# Patient Record
Sex: MALE | Race: WHITE | NOT HISPANIC OR LATINO | Employment: FULL TIME | URBAN - METROPOLITAN AREA
[De-identification: names, ages, dates, MRNs, and addresses within clinical notes are randomized per-mention and may not be internally consistent; named-entity substitution may affect disease eponyms.]

---

## 2019-10-02 ENCOUNTER — TELEPHONE (OUTPATIENT)
Dept: FAMILY MEDICINE CLINIC | Facility: CLINIC | Age: 44
End: 2019-10-02

## 2019-10-15 ENCOUNTER — OFFICE VISIT (OUTPATIENT)
Dept: FAMILY MEDICINE CLINIC | Facility: CLINIC | Age: 44
End: 2019-10-15

## 2019-10-15 VITALS
SYSTOLIC BLOOD PRESSURE: 118 MMHG | OXYGEN SATURATION: 97 % | HEART RATE: 67 BPM | HEIGHT: 76 IN | BODY MASS INDEX: 28.01 KG/M2 | TEMPERATURE: 97.9 F | WEIGHT: 230 LBS | DIASTOLIC BLOOD PRESSURE: 64 MMHG

## 2019-10-15 DIAGNOSIS — F40.01 PANIC DISORDER WITH AGORAPHOBIA: ICD-10-CM

## 2019-10-15 DIAGNOSIS — Z23 NEED FOR INFLUENZA VACCINATION: ICD-10-CM

## 2019-10-15 DIAGNOSIS — F41.9 ANXIETY: Primary | ICD-10-CM

## 2019-10-15 PROCEDURE — 90686 IIV4 VACC NO PRSV 0.5 ML IM: CPT | Performed by: FAMILY MEDICINE

## 2019-10-15 PROCEDURE — 99213 OFFICE O/P EST LOW 20 MIN: CPT | Performed by: FAMILY MEDICINE

## 2019-10-15 PROCEDURE — 90471 IMMUNIZATION ADMIN: CPT | Performed by: FAMILY MEDICINE

## 2019-10-15 RX ORDER — CITALOPRAM 20 MG/1
20 TABLET ORAL DAILY
COMMUNITY
End: 2019-10-15 | Stop reason: SDUPTHER

## 2019-10-15 RX ORDER — CITALOPRAM 40 MG/1
TABLET ORAL
Qty: 90 TABLET | Refills: 1 | Status: SHIPPED | OUTPATIENT
Start: 2019-10-15 | End: 2020-04-06

## 2019-10-15 RX ORDER — PROPRANOLOL HYDROCHLORIDE 10 MG/1
10 TABLET ORAL AS NEEDED
COMMUNITY
End: 2021-06-21

## 2019-10-15 NOTE — PROGRESS NOTES
Chief Complaint   Patient presents with   • Establish Care     transfer care from [facility], med refills   • Anxiety       Subjective   Chandu Headley is a 43 y.o. male.     History of Present Illness   PT is here for refills and to get reestablished and  Anxiety- doing better and he states wants to increase the dosage a little bit.  No depression.   No HI or SI  Agoraphobia- states 10 mg doesn't work so well.      The following portions of the patient's history were reviewed and updated as appropriate: allergies, current medications, past family history, past medical history, past social history, past surgical history and problem list.    Review of Systems   Constitutional: Negative for fatigue.   Eyes: Negative for blurred vision.   Respiratory: Negative for cough, chest tightness and shortness of breath.    Cardiovascular: Negative for chest pain, palpitations and leg swelling.   Neurological: Negative for dizziness, light-headedness and headache.       Patient Active Problem List   Diagnosis   • Panic disorder with agoraphobia   • Onychomycosis of toenail   • High risk medication use   • Allergic rhinitis   • Anxiety       Allergies   Allergen Reactions   • Penicillins Rash         Current Outpatient Medications:   •  citalopram (CeleXA) 40 MG tablet, One po qd, Disp: 90 tablet, Rfl: 1  •  propranolol (INDERAL) 10 MG tablet, Take 10 mg by mouth As Needed (anxiety)., Disp: , Rfl:     Past Medical History:   Diagnosis Date   • Allergic rhinitis    • Anxiety    • High risk medication use    • Onychomycosis of toenail    • Panic disorder with agoraphobia        History reviewed. No pertinent surgical history.    Family History   Problem Relation Age of Onset   • No Known Problems Mother    • Arthritis Paternal Grandmother        Social History     Tobacco Use   • Smoking status: Former Smoker   • Smokeless tobacco: Never Used   Substance Use Topics   • Alcohol use: Yes            Objective     Visit Vitals  /64  "  Pulse 67   Temp 97.9 °F (36.6 °C)   Ht 193 cm (76\")   Wt 104 kg (230 lb)   SpO2 97%   BMI 28.00 kg/m²       Physical Exam   Constitutional: He appears well-developed. No distress.   Eyes: Conjunctivae and lids are normal.   Neck: Trachea normal. Carotid bruit is not present. No thyroid mass and no thyromegaly present.   Cardiovascular: Normal rate, regular rhythm and normal heart sounds.   Pulmonary/Chest: Effort normal and breath sounds normal.   Lymphadenopathy:     He has no cervical adenopathy.   Neurological: He is alert. He is not disoriented.   Skin: Skin is warm and dry.   Psychiatric: He has a normal mood and affect. His speech is normal and behavior is normal. He is attentive.         Procedures    Assessment/Plan   Problems Addressed this Visit        Other    Panic disorder with agoraphobia    Relevant Medications    citalopram (CeleXA) 40 MG tablet    Anxiety - Primary    Relevant Medications    citalopram (CeleXA) 40 MG tablet          No orders of the defined types were placed in this encounter.      Current Outpatient Medications   Medication Sig Dispense Refill   • citalopram (CeleXA) 40 MG tablet One po qd 90 tablet 1   • propranolol (INDERAL) 10 MG tablet Take 10 mg by mouth As Needed (anxiety).       No current facility-administered medications for this visit.      Refill and increase dosage of celexa from 20 mg to 40 mg.    Will double up on the inderal as needed.  SE discussed .  Return for anxiety.  Flu shot today.   There are no Patient Instructions on file for this visit.         "

## 2020-04-04 DIAGNOSIS — F41.9 ANXIETY: ICD-10-CM

## 2020-04-06 RX ORDER — CITALOPRAM 40 MG/1
TABLET ORAL
Qty: 30 TABLET | Refills: 0 | Status: SHIPPED | OUTPATIENT
Start: 2020-04-06 | End: 2020-05-11 | Stop reason: SDUPTHER

## 2020-04-30 DIAGNOSIS — F41.9 ANXIETY: ICD-10-CM

## 2020-05-01 RX ORDER — CITALOPRAM 40 MG/1
TABLET ORAL
Qty: 30 TABLET | Refills: 0 | OUTPATIENT
Start: 2020-05-01

## 2020-05-11 ENCOUNTER — TELEPHONE (OUTPATIENT)
Dept: FAMILY MEDICINE CLINIC | Facility: CLINIC | Age: 45
End: 2020-05-11

## 2020-05-11 DIAGNOSIS — F41.9 ANXIETY: ICD-10-CM

## 2020-05-11 RX ORDER — CITALOPRAM 40 MG/1
TABLET ORAL
Qty: 30 TABLET | Refills: 0 | Status: SHIPPED | OUTPATIENT
Start: 2020-05-11 | End: 2020-05-15

## 2020-05-11 NOTE — TELEPHONE ENCOUNTER
PT REQUESTED TO KNOW IF HE COULD ENOUGH MEDICINE TO LAST HIM UNTIL HIS Friday APPT.. THE MEDICINE IS citalopram (CeleXA) 40 MG tablet. PLEASE ADVISE

## 2020-05-15 ENCOUNTER — TELEMEDICINE (OUTPATIENT)
Dept: FAMILY MEDICINE CLINIC | Facility: CLINIC | Age: 45
End: 2020-05-15

## 2020-05-15 DIAGNOSIS — S76.219A GROIN STRAIN, UNSPECIFIED LATERALITY, INITIAL ENCOUNTER: ICD-10-CM

## 2020-05-15 DIAGNOSIS — F40.01 PANIC DISORDER WITH AGORAPHOBIA: Primary | ICD-10-CM

## 2020-05-15 DIAGNOSIS — F41.9 ANXIETY: ICD-10-CM

## 2020-05-15 PROCEDURE — 99213 OFFICE O/P EST LOW 20 MIN: CPT | Performed by: FAMILY MEDICINE

## 2020-05-15 RX ORDER — ESCITALOPRAM OXALATE 20 MG/1
20 TABLET ORAL DAILY
Qty: 30 TABLET | Refills: 3 | Status: SHIPPED | OUTPATIENT
Start: 2020-05-15 | End: 2020-08-31

## 2020-05-15 NOTE — PROGRESS NOTES
Chief Complaint   Patient presents with   • Anxiety       Subjective   Chandu Headley is a 44 y.o. male.     History of Present Illness   The patient presents today for follow-up via a video visit due to the COVID-19 pandemic for  Anxiety- celexa is not working for him any more.  No issues with depression.  No HI or SI.  Agoraphobia- propranalol not working well anymore either.  5 days ago bent over and injured groin area and was very painful but over past 2 days somewhat better.  He noticed a lump above his groin bc of this but is getting better.    Minimal exercise.        The following portions of the patient's history were reviewed and updated as appropriate: allergies, current medications, past family history, past medical history, past social history, past surgical history and problem list.    Review of Systems   Constitutional: Negative for fatigue.   Eyes: Negative for blurred vision.   Respiratory: Negative for cough, chest tightness and shortness of breath.    Cardiovascular: Negative for chest pain, palpitations and leg swelling.   Neurological: Negative for dizziness, light-headedness and headache.       Patient Active Problem List   Diagnosis   • Panic disorder with agoraphobia   • Onychomycosis of toenail   • High risk medication use   • Allergic rhinitis   • Anxiety   • Groin strain       Allergies   Allergen Reactions   • Penicillins Rash         Current Outpatient Medications:   •  escitalopram (Lexapro) 20 MG tablet, Take 1 tablet by mouth Daily., Disp: 30 tablet, Rfl: 3  •  propranolol (INDERAL) 10 MG tablet, Take 10 mg by mouth As Needed (anxiety)., Disp: , Rfl:     Past Medical History:   Diagnosis Date   • Allergic rhinitis    • Anxiety    • High risk medication use    • Onychomycosis of toenail    • Panic disorder with agoraphobia        History reviewed. No pertinent surgical history.    Family History   Problem Relation Age of Onset   • No Known Problems Mother    • Arthritis Paternal  Grandmother        Social History     Tobacco Use   • Smoking status: Former Smoker   • Smokeless tobacco: Never Used   Substance Use Topics   • Alcohol use: Yes            Objective     There were no vitals taken for this visit. Video Visit    Physical Exam  NAD  AAOx3  No labored breathing.  EOMI   PERRLA    No results found for: HGBA1C    No results found for: [id number]    No results found for: TSH    No results found for: CHOL  No results found for: TRIG  No results found for: HDL  No results found for: LDL  No results found for: VLDL  No results found for: LDLHDL      Procedures    Assessment/Plan   Problems Addressed this Visit        Musculoskeletal and Integument    Groin strain       Other    Panic disorder with agoraphobia - Primary    Relevant Medications    escitalopram (Lexapro) 20 MG tablet    Anxiety    Relevant Medications    escitalopram (Lexapro) 20 MG tablet          No orders of the defined types were placed in this encounter.      Current Outpatient Medications   Medication Sig Dispense Refill   • escitalopram (Lexapro) 20 MG tablet Take 1 tablet by mouth Daily. 30 tablet 3   • propranolol (INDERAL) 10 MG tablet Take 10 mg by mouth As Needed (anxiety).       No current facility-administered medications for this visit.        Return in about 4 months (around 9/15/2020) for anxiety.    Patient Instructions   Try to get more active.       stop celexa and start lexapro.  SE discussed.  Pt to give me update in about 2 weeks.    He will give me update about his groin in 2 weeks and if no better then will get a scan done.  Time spent on visit:  15  minutes.  This patient has consented to a telehealth visit via video. The visit was scheduled as a video visit to comply with patient safety concerns in accordance with CDC recommendations.  All vitals recorded within this visit are reported by the patient.

## 2020-06-15 ENCOUNTER — TELEPHONE (OUTPATIENT)
Dept: FAMILY MEDICINE CLINIC | Facility: CLINIC | Age: 45
End: 2020-06-15

## 2020-06-15 NOTE — TELEPHONE ENCOUNTER
PATIENT CALLED AND STATED THAT HE WAS PRESCRIBED escitalopram (Lexapro) 20 MG tablet AND WAS TOLD TO CALL AND GIVE AN UPDATE ON HOW HE WAS DOING ON THE MEDICATION. THE PATIENT STATES THAT IT SEEMS TO BE DOING OKAY AND HE BELIEVES IT IS WORKING. HE IS NOT HAVING ANY SIDE EFFECTS. PLEASE ADVISE.     PATIENT CALL BACK [phone]

## 2020-08-31 DIAGNOSIS — F41.9 ANXIETY: ICD-10-CM

## 2020-08-31 RX ORDER — ESCITALOPRAM OXALATE 20 MG/1
TABLET ORAL
Qty: 30 TABLET | Refills: 3 | Status: SHIPPED | OUTPATIENT
Start: 2020-08-31 | End: 2021-01-07

## 2021-01-07 DIAGNOSIS — F41.9 ANXIETY: ICD-10-CM

## 2021-01-07 RX ORDER — ESCITALOPRAM OXALATE 20 MG/1
TABLET ORAL
Qty: 30 TABLET | Refills: 3 | Status: SHIPPED | OUTPATIENT
Start: 2021-01-07 | End: 2021-05-10

## 2021-01-21 ENCOUNTER — TELEMEDICINE (OUTPATIENT)
Dept: FAMILY MEDICINE CLINIC | Facility: CLINIC | Age: 46
End: 2021-01-21

## 2021-01-21 DIAGNOSIS — F41.9 ANXIETY: Primary | ICD-10-CM

## 2021-01-21 PROCEDURE — 99213 OFFICE O/P EST LOW 20 MIN: CPT | Performed by: FAMILY MEDICINE

## 2021-01-21 RX ORDER — BUSPIRONE HYDROCHLORIDE 10 MG/1
10 TABLET ORAL 2 TIMES DAILY
Qty: 60 TABLET | Refills: 3 | Status: SHIPPED | OUTPATIENT
Start: 2021-01-21 | End: 2021-11-11

## 2021-01-21 NOTE — PROGRESS NOTES
CC:  Anxiety and panic disorder.    Subjective   Chandu Headley is a 45 y.o. male.     History of Present Illness   The patient presents today for follow-up via a video visit due to the COVID-19 pandemic for  PT is here for anxiety and he states he has been on lexapro for some time and doesn't think he is doing too well with it and wants to make adjustments or change med.  He did a little better with celexa but not great.  No HI or SI.  He does have a lot going on.  May have a tiny bit of depression as well.   He has not been on any other med for anxiety other than those 2 listed.    Doesn't really use propranalol.          The following portions of the patient's history were reviewed and updated as appropriate: allergies, current medications, past family history, past medical history, past social history, past surgical history and problem list.    Review of Systems    Patient Active Problem List   Diagnosis   • Panic disorder with agoraphobia   • Onychomycosis of toenail   • High risk medication use   • Allergic rhinitis   • Anxiety   • Groin strain       Allergies   Allergen Reactions   • Penicillins Rash         Current Outpatient Medications:   •  busPIRone (BUSPAR) 10 MG tablet, Take 1 tablet by mouth 2 (two) times a day., Disp: 60 tablet, Rfl: 3  •  escitalopram (LEXAPRO) 20 MG tablet, TAKE 1 TABLET BY MOUTH EVERY DAY, Disp: 30 tablet, Rfl: 3  •  propranolol (INDERAL) 10 MG tablet, Take 10 mg by mouth As Needed (anxiety)., Disp: , Rfl:     Past Medical History:   Diagnosis Date   • Allergic rhinitis    • Anxiety    • High risk medication use    • Onychomycosis of toenail    • Panic disorder with agoraphobia        History reviewed. No pertinent surgical history.    Family History   Problem Relation Age of Onset   • No Known Problems Mother    • Arthritis Paternal Grandmother        Social History     Tobacco Use   • Smoking status: Former Smoker   • Smokeless tobacco: Never Used   Substance Use Topics   •  Alcohol use: Yes            Objective     There were no vitals taken for this visit. Video Visit    Physical Exam  NAD  AAOx3  No labored breathing.  EOMI   PERRLA          Procedures    Assessment/Plan   Problems Addressed this Visit     Anxiety - Primary    Relevant Medications    busPIRone (BUSPAR) 10 MG tablet      Diagnoses       Codes Comments    Anxiety    -  Primary ICD-10-CM: F41.9  ICD-9-CM: 300.00           No orders of the defined types were placed in this encounter.      Current Outpatient Medications   Medication Sig Dispense Refill   • busPIRone (BUSPAR) 10 MG tablet Take 1 tablet by mouth 2 (two) times a day. 60 tablet 3   • escitalopram (LEXAPRO) 20 MG tablet TAKE 1 TABLET BY MOUTH EVERY DAY 30 tablet 3   • propranolol (INDERAL) 10 MG tablet Take 10 mg by mouth As Needed (anxiety).       No current facility-administered medications for this visit.        No follow-ups on file.    There are no Patient Instructions on file for this visit.        Will continue lexapro and start buspar in addition.  Pt to call in few weeks and if no better, then will stop both and switch to a different medication.  SE discussed with pt.    Time spent on visit:  12   minutes.  This patient has consented to a telehealth visit via video. The visit was scheduled as a video visit to comply with patient safety concerns in accordance with CDC recommendations.  All vitals recorded within this visit are reported by the patient.

## 2021-03-15 ENCOUNTER — TELEPHONE (OUTPATIENT)
Dept: FAMILY MEDICINE CLINIC | Facility: CLINIC | Age: 46
End: 2021-03-15

## 2021-03-15 NOTE — TELEPHONE ENCOUNTER
"PATIENT CALLED TO GIVE DR. ZAPATA A MEDICATION UPDATE ON BUSPIRONE. THE PATIENT STATED THAT THE MEDICATION IS WORKING \"OKAY\" AT THIS TIME.    PATIENT CALLBACK: [phone]  "

## 2021-05-09 DIAGNOSIS — F41.9 ANXIETY: ICD-10-CM

## 2021-05-10 RX ORDER — ESCITALOPRAM OXALATE 20 MG/1
TABLET ORAL
Qty: 30 TABLET | Refills: 0 | Status: SHIPPED | OUTPATIENT
Start: 2021-05-10 | End: 2021-06-09

## 2021-06-09 DIAGNOSIS — F41.9 ANXIETY: ICD-10-CM

## 2021-06-09 RX ORDER — ESCITALOPRAM OXALATE 20 MG/1
20 TABLET ORAL DAILY
Qty: 30 TABLET | Refills: 0 | Status: SHIPPED | OUTPATIENT
Start: 2021-06-09 | End: 2021-06-21

## 2021-06-21 ENCOUNTER — TELEMEDICINE (OUTPATIENT)
Dept: FAMILY MEDICINE CLINIC | Facility: CLINIC | Age: 46
End: 2021-06-21

## 2021-06-21 DIAGNOSIS — F41.9 ANXIETY: Primary | ICD-10-CM

## 2021-06-21 PROCEDURE — 99213 OFFICE O/P EST LOW 20 MIN: CPT | Performed by: FAMILY MEDICINE

## 2021-06-21 RX ORDER — FLUOXETINE HYDROCHLORIDE 40 MG/1
40 CAPSULE ORAL DAILY
Qty: 30 CAPSULE | Refills: 3 | Status: SHIPPED | OUTPATIENT
Start: 2021-06-21 | End: 2021-10-12

## 2021-06-21 NOTE — PROGRESS NOTES
CC:  anxiety    Subjective   Chandu Headley is a 45 y.o. male.     History of Present Illness   The patient presents today for follow-up via a video visit due to the COVID-19 pandemic for  PT is here for anxiety follow up.  He is currently on Lexapro and Buspar now and he states he feels more tired and his anxiety is still present.  Some depression as well at times.  No HI or SI.  No palpitations or HA or blurred vision.  He has tried celexa in the past and it was only ok.  Currently buspar is down to once a day.  He doesn't think it makes a difference.  He has no other complaints.          The following portions of the patient's history were reviewed and updated as appropriate: allergies, current medications, past family history, past medical history, past social history, past surgical history and problem list.    Review of Systems    Patient Active Problem List   Diagnosis   • Panic disorder with agoraphobia   • Onychomycosis of toenail   • High risk medication use   • Allergic rhinitis   • Anxiety   • Groin strain       Allergies   Allergen Reactions   • Penicillins Rash         Current Outpatient Medications:   •  busPIRone (BUSPAR) 10 MG tablet, Take 1 tablet by mouth 2 (two) times a day., Disp: 60 tablet, Rfl: 3  •  FLUoxetine (PROzac) 40 MG capsule, Take 1 capsule by mouth Daily., Disp: 30 capsule, Rfl: 3    Past Medical History:   Diagnosis Date   • Allergic rhinitis    • Anxiety    • High risk medication use    • Onychomycosis of toenail    • Panic disorder with agoraphobia        History reviewed. No pertinent surgical history.    Family History   Problem Relation Age of Onset   • No Known Problems Mother    • Arthritis Paternal Grandmother        Social History     Tobacco Use   • Smoking status: Former Smoker   • Smokeless tobacco: Never Used   Substance Use Topics   • Alcohol use: Yes            Objective     There were no vitals taken for this visit. Video Visit    Physical Exam  NAD  AAOx3  No  labored breathing.  EOMI   PERRLA      No results found for: GLUCOSE, BUN, CREATININE, EGFRIFNONA, EGFRIFAFRI, BCR, K, CO2, CALCIUM, PROTENTOTREF, ALBUMIN, LABIL2, BILIRUBIN, AST, ALT    No results found for: WBC, RBC, HGB, HCT, MCV, MCH, MCHC, RDW, RDWSD, MPV, PLT, NEUTRORELPCT, LYMPHORELPCT, MONORELPCT, EOSRELPCT, BASORELPCT, AUTOIGPER, NEUTROABS, LYMPHSABS, MONOSABS, EOSABS, BASOSABS, AUTOIGNUM, NRBC    No results found for: HGBA1C    No results found for: [id number]    No results found for: TSH    No results found for: CHOL  No results found for: TRIG  No results found for: HDL  No results found for: LDL  No results found for: VLDL  No results found for: LDLHDL      Procedures    Assessment/Plan   Problems Addressed this Visit     Anxiety - Primary    Relevant Medications    FLUoxetine (PROzac) 40 MG capsule      Diagnoses       Codes Comments    Anxiety    -  Primary ICD-10-CM: F41.9  ICD-9-CM: 300.00           No orders of the defined types were placed in this encounter.      Current Outpatient Medications   Medication Sig Dispense Refill   • busPIRone (BUSPAR) 10 MG tablet Take 1 tablet by mouth 2 (two) times a day. 60 tablet 3   • FLUoxetine (PROzac) 40 MG capsule Take 1 capsule by mouth Daily. 30 capsule 3     No current facility-administered medications for this visit.       Return in about 4 months (around 10/21/2021).    There are no Patient Instructions on file for this visit.         Time spent on visit:  14   minutes.  This patient has consented to a telehealth visit via video. The visit was scheduled as a video visit to comply with patient safety concerns in accordance with CDC recommendations.  All vitals recorded within this visit are reported by the patient.    Stop lexapro and start fluoxetine-  SE discussed  Hold buspar for now and pt will let me know how he is doing in about 2 weeks.  Warning signs discussed.

## 2021-06-25 ENCOUNTER — TELEPHONE (OUTPATIENT)
Dept: FAMILY MEDICINE CLINIC | Facility: CLINIC | Age: 46
End: 2021-06-25

## 2021-10-12 DIAGNOSIS — F41.9 ANXIETY: ICD-10-CM

## 2021-10-12 RX ORDER — FLUOXETINE HYDROCHLORIDE 40 MG/1
CAPSULE ORAL
Qty: 30 CAPSULE | Refills: 0 | Status: SHIPPED | OUTPATIENT
Start: 2021-10-12 | End: 2021-11-11 | Stop reason: SDUPTHER

## 2021-10-12 NOTE — TELEPHONE ENCOUNTER
Rx Refill Note  Requested Prescriptions     Pending Prescriptions Disp Refills   • FLUoxetine (PROzac) 40 MG capsule [Pharmacy Med Name: FLUOXETINE HCL 40 MG CAPSULE] 30 capsule 3     Sig: TAKE 1 CAPSULE BY MOUTH EVERY DAY      Last office visit with prescribing clinician: 6/21/2021      Next office visit with prescribing clinician: Visit date not found       {TIP  Please add Last Relevant Lab Date if appropriate:23}     Joe Zhu MA  10/12/21, 07:01 EDT

## 2021-11-08 DIAGNOSIS — F41.9 ANXIETY: ICD-10-CM

## 2021-11-08 RX ORDER — FLUOXETINE HYDROCHLORIDE 40 MG/1
CAPSULE ORAL
Qty: 30 CAPSULE | Refills: 0 | OUTPATIENT
Start: 2021-11-08

## 2021-11-08 NOTE — TELEPHONE ENCOUNTER
Rx Refill Note  Requested Prescriptions     Pending Prescriptions Disp Refills   • FLUoxetine (PROzac) 40 MG capsule [Pharmacy Med Name: FLUOXETINE HCL 40 MG CAPSULE] 30 capsule 0     Sig: TAKE 1 CAPSULE BY MOUTH EVERY DAY      Last office visit with prescribing clinician: 6/21/2021  Next office visit with prescribing clinician: Visit date not found            Joe Zhu MA  11/08/21, 07:50 EST

## 2021-11-11 ENCOUNTER — OFFICE VISIT (OUTPATIENT)
Dept: FAMILY MEDICINE CLINIC | Facility: CLINIC | Age: 46
End: 2021-11-11

## 2021-11-11 VITALS
BODY MASS INDEX: 29.49 KG/M2 | HEART RATE: 55 BPM | TEMPERATURE: 97.8 F | RESPIRATION RATE: 16 BRPM | SYSTOLIC BLOOD PRESSURE: 158 MMHG | OXYGEN SATURATION: 96 % | HEIGHT: 76 IN | WEIGHT: 242.2 LBS | DIASTOLIC BLOOD PRESSURE: 90 MMHG

## 2021-11-11 DIAGNOSIS — F41.9 ANXIETY: ICD-10-CM

## 2021-11-11 DIAGNOSIS — N52.8 OTHER MALE ERECTILE DYSFUNCTION: ICD-10-CM

## 2021-11-11 DIAGNOSIS — Z00.00 PHYSICAL EXAM: Primary | ICD-10-CM

## 2021-11-11 DIAGNOSIS — Z11.59 NEED FOR HEPATITIS C SCREENING TEST: ICD-10-CM

## 2021-11-11 DIAGNOSIS — I10 HYPERTENSION, ESSENTIAL: ICD-10-CM

## 2021-11-11 DIAGNOSIS — Z13.6 SCREENING FOR CARDIOVASCULAR CONDITION: ICD-10-CM

## 2021-11-11 PROCEDURE — 99396 PREV VISIT EST AGE 40-64: CPT | Performed by: FAMILY MEDICINE

## 2021-11-11 RX ORDER — LISINOPRIL 10 MG/1
10 TABLET ORAL DAILY
Qty: 30 TABLET | Refills: 3 | Status: SHIPPED | OUTPATIENT
Start: 2021-11-11 | End: 2022-02-15

## 2021-11-11 RX ORDER — FLUOXETINE HYDROCHLORIDE 40 MG/1
40 CAPSULE ORAL DAILY
Qty: 90 CAPSULE | Refills: 1 | Status: SHIPPED | OUTPATIENT
Start: 2021-11-11 | End: 2022-05-31

## 2021-11-11 RX ORDER — SILDENAFIL CITRATE 20 MG/1
TABLET ORAL
Qty: 60 TABLET | Refills: 5 | Status: SHIPPED | OUTPATIENT
Start: 2021-11-11 | End: 2022-10-18

## 2021-11-11 RX ORDER — MULTIPLE VITAMINS W/ MINERALS TAB 9MG-400MCG
1 TAB ORAL DAILY
COMMUNITY

## 2021-11-11 NOTE — PROGRESS NOTES
"Chief Complaint  Annual Exam    Subjective          Chandu Headley presents to [facility]  History of Present Illness  PT is here for refills, labs and CPE.  He is not active.  Anxiety and panic disorder stable with med and needs refills.  No HI or SI or depression.  He is not sure if it is causing him fatigue or not.    ED- occasionally and wants something for this.    High blood pressure even when he checks it at other places.    Moderate drinker.    Objective   Vital Signs:   /90 (BP Location: Right arm, Patient Position: Sitting, Cuff Size: Large Adult)   Pulse 55   Temp 97.8 °F (36.6 °C) (Temporal)   Resp 16   Ht 193 cm (76\")   Wt 110 kg (242 lb 3.2 oz)   SpO2 96%   BMI 29.48 kg/m²     Physical Exam  Vitals and nursing note reviewed.   Constitutional:       General: He is not in acute distress.     Appearance: Normal appearance. He is well-developed. He is not ill-appearing, toxic-appearing or diaphoretic.   HENT:      Head: Normocephalic and atraumatic.      Right Ear: Tympanic membrane, ear canal and external ear normal. There is no impacted cerumen.      Left Ear: Tympanic membrane, ear canal and external ear normal. There is no impacted cerumen.      Nose: Nose normal. No congestion or rhinorrhea.      Mouth/Throat:      Mouth: Mucous membranes are moist.      Pharynx: Oropharynx is clear. No oropharyngeal exudate or posterior oropharyngeal erythema.   Eyes:      General: No scleral icterus.        Right eye: No discharge.         Left eye: No discharge.      Extraocular Movements: Extraocular movements intact.      Conjunctiva/sclera: Conjunctivae normal.      Pupils: Pupils are equal, round, and reactive to light.   Neck:      Thyroid: No thyroid mass or thyromegaly.      Vascular: No JVD.      Trachea: Trachea normal. No tracheal tenderness or tracheal deviation.   Cardiovascular:      Rate and Rhythm: Normal rate and regular rhythm.      Heart sounds: Normal " heart sounds. No murmur heard.  No friction rub. No gallop.    Pulmonary:      Effort: Pulmonary effort is normal. No respiratory distress.      Breath sounds: Normal breath sounds. No stridor. No wheezing, rhonchi or rales.   Chest:      Chest wall: No tenderness.   Abdominal:      General: Bowel sounds are normal. There is no distension.      Palpations: Abdomen is soft. There is no mass.      Tenderness: There is no abdominal tenderness. There is no right CVA tenderness, left CVA tenderness, guarding or rebound.      Hernia: No hernia is present.   Musculoskeletal:         General: No tenderness. Normal range of motion.      Cervical back: Normal range of motion and neck supple.      Right lower leg: No edema.      Left lower leg: No edema.   Lymphadenopathy:      Cervical: No cervical adenopathy.   Skin:     General: Skin is warm and dry.      Coloration: Skin is not jaundiced.   Neurological:      General: No focal deficit present.      Mental Status: He is alert and oriented to person, place, and time.      Sensory: No sensory deficit.      Motor: No weakness or abnormal muscle tone.      Coordination: Coordination normal.      Gait: Gait normal.      Deep Tendon Reflexes: Reflexes normal.   Psychiatric:         Mood and Affect: Mood normal.         Behavior: Behavior normal.         Thought Content: Thought content normal.         Judgment: Judgment normal.        Result Review :                 Assessment and Plan    Diagnoses and all orders for this visit:    1. Physical exam (Primary)  -     Comprehensive Metabolic Panel  -     CBC & Differential  -     Lipid Panel  -     TSH Rfx On Abnormal To Free T4    2. Screening for cardiovascular condition  -     Comprehensive Metabolic Panel  -     Lipid Panel    3. Other male erectile dysfunction  -     sildenafil (REVATIO) 20 MG tablet; Take one to five tablets one hour prior to event  Dispense: 60 tablet; Refill: 5    4. Anxiety  -     FLUoxetine (PROzac) 40 MG  capsule; Take 1 capsule by mouth Daily.  Dispense: 90 capsule; Refill: 1    5. Need for hepatitis C screening test  -     Hepatitis C Antibody    6. Hypertension, essential  -     lisinopril (PRINIVIL,ZESTRIL) 10 MG tablet; Take 1 tablet by mouth Daily.  Dispense: 30 tablet; Refill: 3        Follow Up {Instructions Charge Capture  Follow-up Communications :23}  Return in about 3 months (around 2/11/2022) for hypertension.  Patient was given instructions and counseling regarding his condition or for health maintenance advice. Please see specific information pulled into the AVS if appropriate.     Given warning signs for stroke and MI.    Patient to monitor BP over next week and call me with readings at that time and we can make adjustments accordingly if needed.  Start ED med and SE discussed  Start HTN med and SE discussed.

## 2021-11-12 ENCOUNTER — TELEPHONE (OUTPATIENT)
Dept: FAMILY MEDICINE CLINIC | Facility: CLINIC | Age: 46
End: 2021-11-12

## 2021-11-12 PROBLEM — E78.2 HYPERLIPEMIA, MIXED: Status: ACTIVE | Noted: 2021-11-12

## 2021-11-12 LAB
ALBUMIN SERPL-MCNC: 4.6 G/DL (ref 4–5)
ALBUMIN/GLOB SERPL: 1.8 {RATIO} (ref 1.2–2.2)
ALP SERPL-CCNC: 68 IU/L (ref 44–121)
ALT SERPL-CCNC: 36 IU/L (ref 0–44)
AST SERPL-CCNC: 22 IU/L (ref 0–40)
BASOPHILS # BLD AUTO: 0.1 X10E3/UL (ref 0–0.2)
BASOPHILS NFR BLD AUTO: 1 %
BILIRUB SERPL-MCNC: 0.6 MG/DL (ref 0–1.2)
BUN SERPL-MCNC: 9 MG/DL (ref 6–24)
BUN/CREAT SERPL: 9 (ref 9–20)
CALCIUM SERPL-MCNC: 9.5 MG/DL (ref 8.7–10.2)
CHLORIDE SERPL-SCNC: 102 MMOL/L (ref 96–106)
CHOLEST SERPL-MCNC: 223 MG/DL (ref 100–199)
CO2 SERPL-SCNC: 22 MMOL/L (ref 20–29)
CREAT SERPL-MCNC: 0.95 MG/DL (ref 0.76–1.27)
EOSINOPHIL # BLD AUTO: 0.2 X10E3/UL (ref 0–0.4)
EOSINOPHIL NFR BLD AUTO: 3 %
ERYTHROCYTE [DISTWIDTH] IN BLOOD BY AUTOMATED COUNT: 12.5 % (ref 11.6–15.4)
GLOBULIN SER CALC-MCNC: 2.5 G/DL (ref 1.5–4.5)
GLUCOSE SERPL-MCNC: 118 MG/DL (ref 65–99)
HCT VFR BLD AUTO: 50.7 % (ref 37.5–51)
HCV AB S/CO SERPL IA: 0.1 S/CO RATIO (ref 0–0.9)
HDLC SERPL-MCNC: 38 MG/DL
HGB BLD-MCNC: 17.5 G/DL (ref 13–17.7)
IMM GRANULOCYTES # BLD AUTO: 0.1 X10E3/UL (ref 0–0.1)
IMM GRANULOCYTES NFR BLD AUTO: 1 %
LDLC SERPL CALC-MCNC: 142 MG/DL (ref 0–99)
LYMPHOCYTES # BLD AUTO: 1 X10E3/UL (ref 0.7–3.1)
LYMPHOCYTES NFR BLD AUTO: 13 %
MCH RBC QN AUTO: 31.7 PG (ref 26.6–33)
MCHC RBC AUTO-ENTMCNC: 34.5 G/DL (ref 31.5–35.7)
MCV RBC AUTO: 92 FL (ref 79–97)
MONOCYTES # BLD AUTO: 0.4 X10E3/UL (ref 0.1–0.9)
MONOCYTES NFR BLD AUTO: 5 %
NEUTROPHILS # BLD AUTO: 6.3 X10E3/UL (ref 1.4–7)
NEUTROPHILS NFR BLD AUTO: 77 %
PLATELET # BLD AUTO: 314 X10E3/UL (ref 150–450)
POTASSIUM SERPL-SCNC: 4.3 MMOL/L (ref 3.5–5.2)
PROT SERPL-MCNC: 7.1 G/DL (ref 6–8.5)
RBC # BLD AUTO: 5.52 X10E6/UL (ref 4.14–5.8)
SODIUM SERPL-SCNC: 140 MMOL/L (ref 134–144)
TRIGL SERPL-MCNC: 234 MG/DL (ref 0–149)
TSH SERPL DL<=0.005 MIU/L-ACNC: 2.8 UIU/ML (ref 0.45–4.5)
VLDLC SERPL CALC-MCNC: 43 MG/DL (ref 5–40)
WBC # BLD AUTO: 8.1 X10E3/UL (ref 3.4–10.8)

## 2021-11-12 NOTE — TELEPHONE ENCOUNTER
----- Message from Arielle Skaggs MD sent at 11/12/2021 10:40 AM EST -----  Your LDL (bad cholesterol) and total cholesterol are high.  Work on exercise and eat a low fat, low carb diet.  We can recheck level fasting in about 3 months.  If still high, will need to start med for this.  All else is stable.  Sugars are a little high bc you were not fasting.

## 2021-11-12 NOTE — TELEPHONE ENCOUNTER
LMTCb    Ok for HUB to read    Your LDL (bad cholesterol) and total cholesterol are high.  Work on exercise and eat a low fat, low carb diet.  We can recheck level fasting in about 3 months.  If still high, will need to start med for this.  All else is stable.  Sugars are a little high bc you were not fasting.

## 2022-02-15 ENCOUNTER — OFFICE VISIT (OUTPATIENT)
Dept: FAMILY MEDICINE CLINIC | Facility: CLINIC | Age: 47
End: 2022-02-15

## 2022-02-15 VITALS
BODY MASS INDEX: 28.23 KG/M2 | HEIGHT: 76 IN | HEART RATE: 84 BPM | OXYGEN SATURATION: 96 % | SYSTOLIC BLOOD PRESSURE: 152 MMHG | DIASTOLIC BLOOD PRESSURE: 90 MMHG | RESPIRATION RATE: 16 BRPM | WEIGHT: 231.8 LBS | TEMPERATURE: 98.2 F

## 2022-02-15 DIAGNOSIS — F41.9 ANXIETY: ICD-10-CM

## 2022-02-15 DIAGNOSIS — R06.83 SNORES: ICD-10-CM

## 2022-02-15 DIAGNOSIS — R53.83 OTHER FATIGUE: ICD-10-CM

## 2022-02-15 DIAGNOSIS — I10 HYPERTENSION, ESSENTIAL: Primary | ICD-10-CM

## 2022-02-15 DIAGNOSIS — E78.2 HYPERLIPEMIA, MIXED: ICD-10-CM

## 2022-02-15 PROCEDURE — 99214 OFFICE O/P EST MOD 30 MIN: CPT | Performed by: FAMILY MEDICINE

## 2022-02-15 RX ORDER — LISINOPRIL 20 MG/1
20 TABLET ORAL
Qty: 30 TABLET | Refills: 3 | Status: SHIPPED | OUTPATIENT
Start: 2022-02-15 | End: 2022-03-03 | Stop reason: SDUPTHER

## 2022-02-15 NOTE — PROGRESS NOTES
"Chief Complaint  Anxiety    Subjective          Chandu Headley presents to [facility] PRIMARY [facility]  History of Present Illness  PT is here for refills, labs and  HTN- started med 3 months ago and no CP or HA.  He has actively lost a little wt.  HLD- no med;  No exercise.  Lot of fast foods.    Anxiety stable with medication.  No HI or SI.    Snores and always tired.   Objective   Vital Signs:   /90 (BP Location: Right arm, Patient Position: Sitting, Cuff Size: Adult)   Pulse 84   Temp 98.2 °F (36.8 °C) (Temporal)   Resp 16   Ht 193 cm (76\")   Wt 105 kg (231 lb 12.8 oz)   SpO2 96%   BMI 28.22 kg/m²     Physical Exam  Vitals and nursing note reviewed.   Constitutional:       Appearance: Normal appearance.   Cardiovascular:      Rate and Rhythm: Normal rate and regular rhythm.      Heart sounds: Normal heart sounds. No murmur heard.  No friction rub.   Pulmonary:      Effort: Pulmonary effort is normal. No respiratory distress.      Breath sounds: Normal breath sounds. No stridor. No wheezing or rhonchi.   Neurological:      General: No focal deficit present.      Mental Status: He is alert and oriented to person, place, and time.   Psychiatric:         Mood and Affect: Mood normal.         Behavior: Behavior normal.        Result Review :                 Assessment and Plan    Diagnoses and all orders for this visit:    1. Hypertension, essential (Primary)  -     Comprehensive Metabolic Panel  -     lisinopril (PRINIVIL,ZESTRIL) 20 MG tablet; Take 1 tablet by mouth every night at bedtime.  Dispense: 30 tablet; Refill: 3    2. Anxiety    3. Hyperlipemia, mixed  -     Lipid Panel    4. Other fatigue  -     Ambulatory Referral to Sleep Medicine    5. Snores  -     Ambulatory Referral to Sleep Medicine        Follow Up   Return in about 3 months (around 5/15/2022) for hypertension, hyperlipidema.  Patient was given instructions and counseling regarding his condition or for health maintenance " advice. Please see specific information pulled into the AVS if appropriate.     Labs and refills.    Work on diet and exercise.  Increase lisinopril from 10 to 20 mg and   Given warning signs for stroke and MI.    Patient to monitor BP over next week and call me with readings at that time and we can make adjustments accordingly if needed.  Will get sleep study.

## 2022-02-16 LAB
ALBUMIN SERPL-MCNC: 4.5 G/DL (ref 4–5)
ALBUMIN/GLOB SERPL: 2.5 {RATIO} (ref 1.2–2.2)
ALP SERPL-CCNC: 64 IU/L (ref 44–121)
ALT SERPL-CCNC: 21 IU/L (ref 0–44)
AST SERPL-CCNC: 21 IU/L (ref 0–40)
BILIRUB SERPL-MCNC: 0.5 MG/DL (ref 0–1.2)
BUN SERPL-MCNC: 8 MG/DL (ref 6–24)
BUN/CREAT SERPL: 8 (ref 9–20)
CALCIUM SERPL-MCNC: 9.1 MG/DL (ref 8.7–10.2)
CHLORIDE SERPL-SCNC: 104 MMOL/L (ref 96–106)
CHOLEST SERPL-MCNC: 191 MG/DL (ref 100–199)
CO2 SERPL-SCNC: 22 MMOL/L (ref 20–29)
CREAT SERPL-MCNC: 0.95 MG/DL (ref 0.76–1.27)
GLOBULIN SER CALC-MCNC: 1.8 G/DL (ref 1.5–4.5)
GLUCOSE SERPL-MCNC: 164 MG/DL (ref 65–99)
HDLC SERPL-MCNC: 35 MG/DL
LDLC SERPL CALC-MCNC: 127 MG/DL (ref 0–99)
POTASSIUM SERPL-SCNC: 3.8 MMOL/L (ref 3.5–5.2)
PROT SERPL-MCNC: 6.3 G/DL (ref 6–8.5)
SODIUM SERPL-SCNC: 141 MMOL/L (ref 134–144)
TRIGL SERPL-MCNC: 160 MG/DL (ref 0–149)
VLDLC SERPL CALC-MCNC: 29 MG/DL (ref 5–40)

## 2022-02-17 ENCOUNTER — TELEPHONE (OUTPATIENT)
Dept: FAMILY MEDICINE CLINIC | Facility: CLINIC | Age: 47
End: 2022-02-17

## 2022-02-17 NOTE — TELEPHONE ENCOUNTER
LMTCB    Ok for HUB to read      Your cholesterol and LDL are better than last time.  Continue diet and exercise.  Have pt follow up in 3 months for blood pressure check.

## 2022-02-17 NOTE — TELEPHONE ENCOUNTER
----- Message from Arielle Skaggs MD sent at 2/16/2022 10:59 AM EST -----  Your cholesterol and LDL are better than last time.  Continue diet and exercise.  Have pt follow up in 3 months for blood pressure check.

## 2022-02-18 NOTE — TELEPHONE ENCOUNTER
READ THE FOLLOWING HUB TO READ TO THE PATIENT'S WIFE WHO IS ON THE PATIENT'S  VERBAL.    Ok for HUB to read        Your cholesterol and LDL are better than last time.  Continue diet and exercise.  Have pt follow up in 3 months for blood pressure check.     PATIENT'S WIFE HAD NO FURTHER QUESTIONS AND STATED SHE WILL GIVE THE INFORMATION TO THE PATIENT.      CONTACT: [phone]

## 2022-03-03 ENCOUNTER — TELEPHONE (OUTPATIENT)
Dept: FAMILY MEDICINE CLINIC | Facility: CLINIC | Age: 47
End: 2022-03-03

## 2022-03-03 DIAGNOSIS — I10 HYPERTENSION, ESSENTIAL: ICD-10-CM

## 2022-03-03 RX ORDER — LISINOPRIL 20 MG/1
20 TABLET ORAL
Qty: 30 TABLET | Refills: 3 | Status: SHIPPED | OUTPATIENT
Start: 2022-03-03 | End: 2022-06-29

## 2022-03-03 NOTE — TELEPHONE ENCOUNTER
LMTCB    Ok for HUB to read      Refills for the lisinopril were initially sent in during your visit on 2/15. Resent in today.

## 2022-03-03 NOTE — TELEPHONE ENCOUNTER
Caller: Chandu Headley    Relationship: Self    Best call back number: [phone]       Requested Prescriptions:   Requested Prescriptions     Pending Prescriptions Disp Refills   • lisinopril (PRINIVIL,ZESTRIL) 20 MG tablet 30 tablet 3     Sig: Take 1 tablet by mouth every night at bedtime.        Pharmacy where request should be sent: [facility] #[id number] - [place], [place] - [address] AT [facility] - [phone] [facility] [phone]      Additional details provided by patient: PATIENT IS OUT OF MEDICATION     Does the patient have less than a 3 day supply:  [x] Yes  [] No    Jared Sheriff Rep   03/03/22 13:26 EST

## 2022-03-03 NOTE — TELEPHONE ENCOUNTER
Rx Refill Note  Requested Prescriptions     Pending Prescriptions Disp Refills   • lisinopril (PRINIVIL,ZESTRIL) 20 MG tablet 30 tablet 3     Sig: Take 1 tablet by mouth every night at bedtime.      Last office visit with prescribing clinician: 2/15/2022      Next office visit with prescribing clinician: Visit date not found            Joe Zhu MA  03/03/22, 14:18 EST

## 2022-03-07 DIAGNOSIS — I10 HYPERTENSION, ESSENTIAL: ICD-10-CM

## 2022-03-07 RX ORDER — LISINOPRIL 10 MG/1
TABLET ORAL
Qty: 30 TABLET | Refills: 3 | OUTPATIENT
Start: 2022-03-07

## 2022-03-07 NOTE — TELEPHONE ENCOUNTER
Rx Refill Note  Requested Prescriptions     Pending Prescriptions Disp Refills   • lisinopril (PRINIVIL,ZESTRIL) 10 MG tablet [Pharmacy Med Name: LISINOPRIL 10 MG TABLET] 30 tablet 3     Sig: TAKE 1 TABLET BY MOUTH EVERY DAY      Last office visit with prescribing clinician: 2/15/2022      Next office visit with prescribing clinician: Visit date not found            Joe Zhu MA  03/07/22, 07:53 EST

## 2022-03-28 ENCOUNTER — OFFICE VISIT (OUTPATIENT)
Dept: SLEEP MEDICINE | Facility: HOSPITAL | Age: 47
End: 2022-03-28

## 2022-03-28 VITALS
DIASTOLIC BLOOD PRESSURE: 85 MMHG | HEIGHT: 76 IN | WEIGHT: 232 LBS | SYSTOLIC BLOOD PRESSURE: 142 MMHG | HEART RATE: 66 BPM | BODY MASS INDEX: 28.25 KG/M2 | OXYGEN SATURATION: 96 %

## 2022-03-28 DIAGNOSIS — R53.83 OTHER FATIGUE: ICD-10-CM

## 2022-03-28 DIAGNOSIS — R06.83 SNORES: ICD-10-CM

## 2022-03-28 DIAGNOSIS — G47.54 PARASOMNIA IN CONDITIONS CLASSIFIED ELSEWHERE: ICD-10-CM

## 2022-03-28 DIAGNOSIS — G47.33 OSA (OBSTRUCTIVE SLEEP APNEA): Primary | ICD-10-CM

## 2022-03-28 PROCEDURE — G0463 HOSPITAL OUTPT CLINIC VISIT: HCPCS

## 2022-03-28 NOTE — PROGRESS NOTES
"                                                 [facility]      Patient Care Team:  Arielle Skaggs MD as PCP - General (Family Medicine)  Susy Parra MD as Consulting Physician (Pulmonary Disease)    Referring Provider: Arielle Skaggs MD    Chief complaint:   Waking up jerking and nightmares    History of present illness:    Subjective   This is a 46 year male patient with hx of HTN & anxiety, on Fluoexetin.     He reported issues with \"night terror\", started years ago. No hx of nightmares/terror when he was a child. He thinks it started about 10 years or more.  On average, it occurs once every couple week.  He described events when he has fallen out of the bed resulting in very minor injuries and also described other events when he would be flailing his arms or hitting his wife especially if she tries to awaken him.    He does have recollection of the dreams most of the time which usually happen about 3 hours in the sleep (3 AM).  The dreams are usually about being persecuted or being attacked and he is trying to flee.. He does talk \"mumble in his sleep\" according to his wife. He thinks that his father had similar issues. No family hx of Parkinson. His father had sleep apnea though.    He denies troubles with his memory but his wife stated that he tends to forget things sometimes.  She refractor short and long-term memory.  No prior history of seizure or head trauma.  There was no change in the events since he was started on fluoxetine about 6 months ago.  He did endorse anxiety.      Sleep schedule:  -Bedtime: Midnight- 1 am  -Sleep latency: 15 min  -Wake up time: 7:45 AM , does not feel refreshed  -Nocturnal awakenin   -Perceived sleep hours: 7-8      ESS: Total score: 2     Additional symptoms include grinding teeth and leg jerks. He reported loud snoring which disturbs his wife but does not awaken him at night.     Review of Systems  Constitutional: No fever or chills. No changes in " "appetite.   ENMT: No sinus congestion, postnasal drip, hoarsness  Cardiovascular: No chest pain, palpitation or legs swelling.    Respiratory: No dyspnea, cough or wheezing.  Gastrointestinal: No constipation, diarrhea, abdominal pain or acid reflux  Neurology: No headache, weakness, numbness or dizziness.   Musculoskeletal: No joints pain, stiffness or swelling.   Psychiatry: No depression, anxiety or irritability.   Hem/Lymphatic: No swollen glands or easy bruising.  Integumentary: No rash.  Endocrinology: No excessive thirst, cold or warm intolerance.   Urinary: No dysuria, bloody urine or frequent urination.     History  Past Medical History:   Diagnosis Date   • Allergic rhinitis    • Anxiety    • High risk medication use    • Onychomycosis of toenail    • Panic disorder with agoraphobia    , No past surgical history on file.,   Family History   Problem Relation Age of Onset   • No Known Problems Mother    • Arthritis Paternal Grandmother    ,   Social History     Socioeconomic History   • Marital status:    Tobacco Use   • Smoking status: Former Smoker   • Smokeless tobacco: Never Used   Substance and Sexual Activity   • Alcohol use: Yes   • Drug use: No       , (Not in a hospital admission)  , Scheduled Meds:   and Allergies:  Penicillins    Medications:    Current Outpatient Medications:   •  FLUoxetine (PROzac) 40 MG capsule, Take 1 capsule by mouth Daily., Disp: 90 capsule, Rfl: 1  •  lisinopril (PRINIVIL,ZESTRIL) 20 MG tablet, Take 1 tablet by mouth every night at bedtime., Disp: 30 tablet, Rfl: 3  •  multivitamin with minerals (MULTIVITAMIN ADULTS PO), Take 1 tablet by mouth Daily., Disp: , Rfl:   •  sildenafil (REVATIO) 20 MG tablet, Take one to five tablets one hour prior to event, Disp: 60 tablet, Rfl: 5      Objective   Vital Signs:  Vitals:    03/28/22 0739   BP: 142/85   Pulse: 66   SpO2: 96%   Weight: 105 kg (232 lb)   Height: 193 cm (76\")     Body mass index is 28.24 kg/m².        Physical " Exam:        Constitutional: Not in acute distress.  Eyes: Injected conjunctiva, EOMI. pupils equal reactive to light.  ENMT: Phillips score 2. Mallampati score 2. No oral thrush. Tonsils grade 1. Narrow distance in between the posterior pharyngeal pillars..  Neck:  No thyromegaly.  Trachea midline.  Heart: Regular rhythm and rate, no murmur  Lungs: Good and equal air entry bilaterally. No crackles or wheezing.  Nonlabored breathing.       Abdomen:  Soft.  No tenderness.  Positive bowel sounds.  Extremities: No cyanosis, clubbing or pitting edema.  Warm extremities and well-perfused.  Neuro: Conscious, alert, oriented x3.  Gait is normal.  Strength 5/5 in arms and legs.  Psych: Appropriate mood and affect.    Integumentary: No rash.  Normal skin turgor.  Lymphatic: No palpable cervical or supraclavicular lymph nodes.    Diagnostic data:  Labs on 11/11/1:  Hb 17; bicarb 22    Assessment   1. Hypersomnia, unspecified.  Unclear whether RBD or confusional arousal.  2. Snoring: Seems mild and less likely sleep apnea      Plan:  Check PSG.  Discussed the possibility of RBD or confusional arousal.  The latter is unlikely to occur at this age.    Counseled for sleep hygiene and to get at least 7-8 hours of sleep and have consistent sleep schedule.          Susy Parra MD  03/28/22  08:22 EDT    This note was dictated utilizing Dragon dictation

## 2022-05-27 ENCOUNTER — HOSPITAL ENCOUNTER (OUTPATIENT)
Dept: SLEEP MEDICINE | Facility: HOSPITAL | Age: 47
Discharge: HOME | End: 2022-05-27
Admitting: INTERNAL MEDICINE

## 2022-05-27 VITALS — HEIGHT: 76 IN | WEIGHT: 232 LBS | BODY MASS INDEX: 28.25 KG/M2

## 2022-05-27 DIAGNOSIS — G47.54 PARASOMNIA IN CONDITIONS CLASSIFIED ELSEWHERE: ICD-10-CM

## 2022-05-27 PROCEDURE — 95810 POLYSOM 6/> YRS 4/> PARAM: CPT

## 2022-05-28 DIAGNOSIS — F41.9 ANXIETY: ICD-10-CM

## 2022-05-31 RX ORDER — FLUOXETINE HYDROCHLORIDE 40 MG/1
CAPSULE ORAL
Qty: 30 CAPSULE | Refills: 0 | Status: SHIPPED | OUTPATIENT
Start: 2022-05-31 | End: 2022-06-29

## 2022-05-31 NOTE — TELEPHONE ENCOUNTER
Rx Refill Note  Requested Prescriptions     Pending Prescriptions Disp Refills   • FLUoxetine (PROzac) 40 MG capsule [Pharmacy Med Name: FLUOXETINE HCL 40 MG CAPSULE] 30 capsule 5     Sig: TAKE 1 CAPSULE BY MOUTH EVERY DAY      Last office visit with prescribing clinician: 2/15/2022      Next office visit with prescribing clinician: Visit date not found            Joe Zhu MA  05/31/22, 07:16 EDT

## 2022-06-27 NOTE — PROCEDURES
"Polysomnogram Report    Chandu Headley  1975  [id number]    06/27/22  12:02 EDT    Interpreting Physician: Susy Parra MD    Referring Physician:  Susy Parra MD    Primary Care Physician: Arielle Skaggs MD    Clinical Information: Patient is a 46 y.o. male.  Height: 193 cm (76\") Weight: 105 kg (232 lb) BMI (Calculated): 28.3.  Patient complains of night terror described as vivid dreams associated with body movements.  Additional symptoms include loud snoring.  Pertinent medications include Prozac.    Steen Sleepiness Scale: Total score: 2    Methods: Study performed based on the standardized protocol.      POLYSOMNOGRAM RESULTS:   Diagnostic polysomnography:  Sleep recording was performed over 495 min. Patient slept for 393 min including 357 min in supine sleep and resulting in sleep efficiency of 97%. Sleep latency was 10 min and REM latency was 244 min. REM sleep was 10% and slow wave sleep was 22%.     Patient demonstrated a total of 1 obstructive apneas, 1 central apneas, 33 hypopnea and 0 RERA resulting in total AHI of 5.3 events/hour.    Girma SpO2 was 80% with hypoxic burden of 3.1 min.     Total Supine Non Supine REM   AHI 5.3 5.9 0 18     Periodic limb movements did occur. PLMI was 10/h and PLM-AI was 2/h.  Arousal index was 17 events/h .      Impression:   1) Obstructive sleep apnea (G47.33), mild  2) Periodic limb movement of sleep    No observed parasomnia events during the study.    Recommendations:   -RTC to sleep clinic to discuss the results of the study and treatment options.    General recommendations:    - Weight loss is recommended.  - Avoid driving or operating heavy machinery while sleepy.  - Clinical correlation with symptoms of restless leg syndrome.        "

## 2022-06-28 ENCOUNTER — TELEPHONE (OUTPATIENT)
Dept: SLEEP MEDICINE | Facility: HOSPITAL | Age: 47
End: 2022-06-28

## 2022-06-29 DIAGNOSIS — F41.9 ANXIETY: ICD-10-CM

## 2022-06-29 DIAGNOSIS — I10 HYPERTENSION, ESSENTIAL: ICD-10-CM

## 2022-06-29 RX ORDER — LISINOPRIL 20 MG/1
TABLET ORAL
Qty: 14 TABLET | Refills: 0 | Status: SHIPPED | OUTPATIENT
Start: 2022-06-29 | End: 2022-07-27

## 2022-06-29 RX ORDER — FLUOXETINE HYDROCHLORIDE 40 MG/1
CAPSULE ORAL
Qty: 14 CAPSULE | Refills: 0 | Status: SHIPPED | OUTPATIENT
Start: 2022-06-29 | End: 2022-07-26

## 2022-06-29 NOTE — TELEPHONE ENCOUNTER
Rx Refill Note  Requested Prescriptions     Pending Prescriptions Disp Refills   • lisinopril (PRINIVIL,ZESTRIL) 20 MG tablet [Pharmacy Med Name: LISINOPRIL 20 MG TABLET] 30 tablet 3     Sig: TAKE 1 TABLET BY MOUTH EVERYDAY AT BEDTIME   • FLUoxetine (PROzac) 40 MG capsule [Pharmacy Med Name: FLUOXETINE HCL 40 MG CAPSULE] 30 capsule 0     Sig: TAKE 1 CAPSULE BY MOUTH EVERY DAY      Last office visit with prescribing clinician: 2/15/2022      Next office visit with prescribing clinician: Visit date not found            Joe Zhu MA  06/29/22, 07:04 EDT

## 2022-07-25 DIAGNOSIS — F41.9 ANXIETY: ICD-10-CM

## 2022-07-26 DIAGNOSIS — I10 HYPERTENSION, ESSENTIAL: ICD-10-CM

## 2022-07-26 RX ORDER — FLUOXETINE HYDROCHLORIDE 40 MG/1
CAPSULE ORAL
Qty: 14 CAPSULE | Refills: 0 | Status: SHIPPED | OUTPATIENT
Start: 2022-07-26 | End: 2022-08-08 | Stop reason: SDUPTHER

## 2022-07-27 RX ORDER — LISINOPRIL 20 MG/1
TABLET ORAL
Qty: 7 TABLET | Refills: 0 | Status: SHIPPED | OUTPATIENT
Start: 2022-07-27 | End: 2022-08-08 | Stop reason: SDUPTHER

## 2022-07-27 NOTE — TELEPHONE ENCOUNTER
Rx Refill Note  Requested Prescriptions     Pending Prescriptions Disp Refills   • lisinopril (PRINIVIL,ZESTRIL) 20 MG tablet [Pharmacy Med Name: LISINOPRIL 20 MG TABLET] 14 tablet 0     Sig: TAKE 1 TABLET BY MOUTH EVERYDAY AT BEDTIME      Last office visit with prescribing clinician: 2/15/2022      Next office visit with prescribing clinician: Visit date not found            Joe Zhu, SAVANA/LMLANE  07/27/22, 07:28 EDT

## 2022-08-08 ENCOUNTER — OFFICE VISIT (OUTPATIENT)
Dept: SLEEP MEDICINE | Facility: HOSPITAL | Age: 47
End: 2022-08-08

## 2022-08-08 VITALS
HEIGHT: 76 IN | DIASTOLIC BLOOD PRESSURE: 96 MMHG | HEART RATE: 60 BPM | BODY MASS INDEX: 28.01 KG/M2 | WEIGHT: 230 LBS | OXYGEN SATURATION: 96 % | SYSTOLIC BLOOD PRESSURE: 142 MMHG

## 2022-08-08 DIAGNOSIS — F41.9 ANXIETY: ICD-10-CM

## 2022-08-08 DIAGNOSIS — G47.33 OSA (OBSTRUCTIVE SLEEP APNEA): Primary | ICD-10-CM

## 2022-08-08 DIAGNOSIS — I10 HYPERTENSION, ESSENTIAL: ICD-10-CM

## 2022-08-08 PROCEDURE — G0463 HOSPITAL OUTPT CLINIC VISIT: HCPCS

## 2022-08-08 RX ORDER — FLUOXETINE HYDROCHLORIDE 40 MG/1
40 CAPSULE ORAL DAILY
Qty: 14 CAPSULE | Refills: 0 | Status: SHIPPED | OUTPATIENT
Start: 2022-08-08 | End: 2022-08-15 | Stop reason: SDUPTHER

## 2022-08-08 RX ORDER — LISINOPRIL 20 MG/1
20 TABLET ORAL
Qty: 7 TABLET | Refills: 0 | Status: SHIPPED | OUTPATIENT
Start: 2022-08-08 | End: 2022-08-15 | Stop reason: SDUPTHER

## 2022-08-08 NOTE — PROGRESS NOTES
[facility]                          Chief Complaint:   Follow up for obstructive sleep apnea    History of present illness:   Subjective     This is a 46 year old male patient here today to discuss the result of the sleep study.    Patient complains of night terror described as vivid dreams associated with body movements.  Additional symptoms include loud snoring.  Pertinent medications include Prozac. Events occurs twice a week on average and have occasionally resulted in mild injuries to himself and his partner.     PSG May 2022: AHI 5.3/H (hypopneas).  PLM 10/H.    Sleep schedule:: Midnight-7:30 AM      ESS: Total score: 5     Sleep is not refreshing and he is always tired.  He has comorbid HTN and takes medications.    He stated that he has been on Prozac for a while but even prior to being on Prozac, he had issues with nightmares and acting out his dreams.  This has been going on for years.    REVIEW OF SYSTEMS:   HEENT: No nasal congestion or postnasal drip   CARDIOVASCULAR: No chest pain, chest pressure or chest discomfort. No palpitations or edema.   RESPIRATORY: No shortness of breath, cough or sputum.   GASTROINTESTINAL: No abdominal bloating or reflux   NEUROLOGICAL/PSYCHOATRY: Anxiety and depression    Past Medical History:  Past Medical History:   Diagnosis Date   • Allergic rhinitis    • Anxiety    • High risk medication use    • Onychomycosis of toenail    • Panic disorder with agoraphobia    , No past surgical history on file.,   Family History   Problem Relation Age of Onset   • No Known Problems Mother    • Arthritis Paternal Grandmother    ,   Social History     Socioeconomic History   • Marital status:    Tobacco Use   • Smoking status: Former Smoker   • Smokeless tobacco: Never Used   Substance and Sexual Activity   • Alcohol use: Yes   • Drug use: No        and Allergies:  Penicillins    Medication Review:     Current Outpatient Medications:   •   "FLUoxetine (PROzac) 40 MG capsule, TAKE 1 CAPSULE BY MOUTH EVERY DAY, Disp: 14 capsule, Rfl: 0  •  lisinopril (PRINIVIL,ZESTRIL) 20 MG tablet, TAKE 1 TABLET BY MOUTH EVERYDAY AT BEDTIME, Disp: 7 tablet, Rfl: 0  •  multivitamin with minerals tablet tablet, Take 1 tablet by mouth Daily., Disp: , Rfl:   •  sildenafil (REVATIO) 20 MG tablet, Take one to five tablets one hour prior to event, Disp: 60 tablet, Rfl: 5      Objective   Vital Signs:  Vitals:    08/08/22 0900   BP: 142/96   Pulse: 60   SpO2: 96%   Weight: 104 kg (230 lb)   Height: 193 cm (75.98\")     Body mass index is 28.01 kg/m².          Physical Exam:   General Appearance:    Alert, cooperative, in no acute distress   ENMT:  Freidman score 2, narrow distance in between the posterior pharyngeal pillars   Eyes  injected conjunctivo-.  EOMI   Neck:   Trachea midline. No thyromegaly.   Lungs:     Clear to auscultation,respirations regular, even and                  unlabored    Heart:    Regular rhythm and normal rate, normal S1 and S2, no            Murmur.   Abdomen:    Overweight.  Soft.  No tenderness.  No HSM    Integumentary:  No rash or ecchymosis   Neuro:   Conscious, alert, oriented x3. Appropriate mood and affect.    Lymphatics  no palpable cervical supraclavicular lymph nodes   Extremities:   Moves all extremities well, no edema, no cyanosis, no             Redness              Diagnostic data:    Triglycerides   Date Value Ref Range Status   02/15/2022 160 (H) 0 - 149 mg/dL Final     HDL Cholesterol   Date Value Ref Range Status   02/15/2022 35 (L) >39 mg/dL Final     Hemoglobin   Date Value Ref Range Status   11/11/2021 17.5 13.0 - 17.7 g/dL Final     Total CO2   Date Value Ref Range Status   02/15/2022 22 20 - 29 mmol/L Final       No results found for: HGBA1C  Triglycerides   Date Value Ref Range Status   02/15/2022 160 (H) 0 - 149 mg/dL Final     HDL Cholesterol   Date Value Ref Range Status   02/15/2022 35 (L) >39 mg/dL Final     Hemoglobin "   Date Value Ref Range Status   11/11/2021 17.5 13.0 - 17.7 g/dL Final     Total CO2   Date Value Ref Range Status   02/15/2022 22 20 - 29 mmol/L Final        Assessment   1. MELISSA, mid  2. Parasomnia, clinically suggestive of RBD but not evident on testing  3. Nasal congestion          PLAN:    Initiate CPAP therapy.  Although he has mild sleep apnea but he has comorbid HTN and symptoms of fatigue and also most importantly significant issues with parasomnia that is disturbing his relationship with his spouse.     Depending on how he do with CPAP therapy then we could consider discontinuing Prozac if he continues to experience parasomnia.  Another consideration would be to perform a titration test with PAP to elicit parasomnia and make a final diagnosis.    Flonase for nasal congestion.  He thinks that he has a deviated septum and I did recommend that if he does not get relief from the Flonase and the CPAP then he could consider seeing an ENT physician          This note was dictated utilizing [employer] dictation

## 2022-08-08 NOTE — TELEPHONE ENCOUNTER
Rx Refill Note  Requested Prescriptions     Pending Prescriptions Disp Refills   • lisinopril (PRINIVIL,ZESTRIL) 20 MG tablet 7 tablet 0     Sig: Take 1 tablet by mouth every night at bedtime.   • FLUoxetine (PROzac) 40 MG capsule 14 capsule 0     Sig: Take 1 capsule by mouth Daily.      Last office visit with prescribing clinician: 2/15/2022      Next office visit with prescribing clinician: 8/15/2022            Joe Zhu, SAVANA/LMR  08/08/22, 11:43 EDT

## 2022-08-09 ENCOUNTER — TELEPHONE (OUTPATIENT)
Dept: SLEEP MEDICINE | Facility: HOSPITAL | Age: 47
End: 2022-08-09

## 2022-08-15 ENCOUNTER — TELEMEDICINE (OUTPATIENT)
Dept: FAMILY MEDICINE CLINIC | Facility: CLINIC | Age: 47
End: 2022-08-15

## 2022-08-15 DIAGNOSIS — R53.83 OTHER FATIGUE: ICD-10-CM

## 2022-08-15 DIAGNOSIS — Z99.89 OSA ON CPAP: ICD-10-CM

## 2022-08-15 DIAGNOSIS — I10 HYPERTENSION, ESSENTIAL: Primary | ICD-10-CM

## 2022-08-15 DIAGNOSIS — E78.2 HYPERLIPEMIA, MIXED: ICD-10-CM

## 2022-08-15 DIAGNOSIS — G47.33 OSA ON CPAP: ICD-10-CM

## 2022-08-15 DIAGNOSIS — F41.9 ANXIETY: ICD-10-CM

## 2022-08-15 PROCEDURE — 99214 OFFICE O/P EST MOD 30 MIN: CPT | Performed by: FAMILY MEDICINE

## 2022-08-15 RX ORDER — LISINOPRIL 20 MG/1
20 TABLET ORAL
Qty: 90 TABLET | Refills: 1 | Status: SHIPPED | OUTPATIENT
Start: 2022-08-15 | End: 2023-02-28 | Stop reason: SDUPTHER

## 2022-08-15 RX ORDER — FLUOXETINE HYDROCHLORIDE 40 MG/1
40 CAPSULE ORAL DAILY
Qty: 90 CAPSULE | Refills: 1 | Status: SHIPPED | OUTPATIENT
Start: 2022-08-15 | End: 2023-03-02

## 2022-08-15 NOTE — PROGRESS NOTES
CC:  HTN    Subjective   Chandu Headley is a 46 y.o. male.     History of Present Illness   The patient presents today for follow-up via a video visit due to the COVID-19 pandemic for    HTN- no CP or HA or blurred vision;  He was out of med for a week but staying controlled  Anxiety- stable with medication and no HI or SI;  He is taking it at night but feels tired and fatigued more than normal    HLD- no med and lipids high.    MELISSA-patient will be starting a CPAP machine next week.        The following portions of the patient's history were reviewed and updated as appropriate: allergies, current medications, past family history, past medical history, past social history, past surgical history and problem list.    Review of Systems    Patient Active Problem List   Diagnosis   • Panic disorder with agoraphobia   • Onychomycosis of toenail   • High risk medication use   • Allergic rhinitis   • Anxiety   • Groin strain   • Physical exam   • Other male erectile dysfunction   • Hypertension, essential   • Hyperlipemia, mixed   • Other fatigue   • Snores   • MELISSA on CPAP       Allergies   Allergen Reactions   • Penicillins Rash         Current Outpatient Medications:   •  FLUoxetine (PROzac) 40 MG capsule, Take 1 capsule by mouth Daily., Disp: 90 capsule, Rfl: 1  •  lisinopril (PRINIVIL,ZESTRIL) 20 MG tablet, Take 1 tablet by mouth every night at bedtime., Disp: 90 tablet, Rfl: 1  •  multivitamin with minerals tablet tablet, Take 1 tablet by mouth Daily., Disp: , Rfl:   •  sildenafil (REVATIO) 20 MG tablet, Take one to five tablets one hour prior to event, Disp: 60 tablet, Rfl: 5    Past Medical History:   Diagnosis Date   • Allergic rhinitis    • Anxiety    • High risk medication use    • Onychomycosis of toenail    • Panic disorder with agoraphobia        History reviewed. No pertinent surgical history.    Family History   Problem Relation Age of Onset   • No Known Problems Mother    • Arthritis Paternal  Grandmother        Social History     Tobacco Use   • Smoking status: Former Smoker   • Smokeless tobacco: Never Used   Substance Use Topics   • Alcohol use: Yes            Objective     There were no vitals taken for this visit. Video Visit    Physical Exam  NAD  AAOx3  No labored breathing    Lab Results   Component Value Date    GLUCOSE 164 (H) 02/15/2022    BUN 8 02/15/2022    CREATININE 0.95 02/15/2022    EGFRIFNONA 96 02/15/2022    EGFRIFAFRI 111 02/15/2022    BCR 8 (L) 02/15/2022    K 3.8 02/15/2022    CO2 22 02/15/2022    CALCIUM 9.1 02/15/2022    PROTENTOTREF 6.3 02/15/2022    ALBUMIN 4.5 02/15/2022    LABIL2 2.5 (H) 02/15/2022    AST 21 02/15/2022    ALT 21 02/15/2022       WBC   Date Value Ref Range Status   11/11/2021 8.1 3.4 - 10.8 x10E3/uL Final     RBC   Date Value Ref Range Status   11/11/2021 5.52 4.14 - 5.80 x10E6/uL Final     Hemoglobin   Date Value Ref Range Status   11/11/2021 17.5 13.0 - 17.7 g/dL Final     Hematocrit   Date Value Ref Range Status   11/11/2021 50.7 37.5 - 51.0 % Final     MCV   Date Value Ref Range Status   11/11/2021 92 79 - 97 fL Final     MCH   Date Value Ref Range Status   11/11/2021 31.7 26.6 - 33.0 pg Final     MCHC   Date Value Ref Range Status   11/11/2021 34.5 31.5 - 35.7 g/dL Final     RDW   Date Value Ref Range Status   11/11/2021 12.5 11.6 - 15.4 % Final     Platelets   Date Value Ref Range Status   11/11/2021 314 150 - 450 x10E3/uL Final     Neutrophil Rel %   Date Value Ref Range Status   11/11/2021 77 Not Estab. % Final     Lymphocyte Rel %   Date Value Ref Range Status   11/11/2021 13 Not Estab. % Final     Monocyte Rel %   Date Value Ref Range Status   11/11/2021 5 Not Estab. % Final     Eosinophil Rel %   Date Value Ref Range Status   11/11/2021 3 Not Estab. % Final     Basophil Rel %   Date Value Ref Range Status   11/11/2021 1 Not Estab. % Final     Neutrophils Absolute   Date Value Ref Range Status   11/11/2021 6.3 1.4 - 7.0 x10E3/uL Final     Lymphocytes  Absolute   Date Value Ref Range Status   11/11/2021 1.0 0.7 - 3.1 x10E3/uL Final     Monocytes Absolute   Date Value Ref Range Status   11/11/2021 0.4 0.1 - 0.9 x10E3/uL Final     Eosinophils Absolute   Date Value Ref Range Status   11/11/2021 0.2 0.0 - 0.4 x10E3/uL Final     Basophils Absolute   Date Value Ref Range Status   11/11/2021 0.1 0.0 - 0.2 x10E3/uL Final       No results found for: HGBA1C    No results found for: CPHPZYFD82    TSH   Date Value Ref Range Status   11/11/2021 2.800 0.450 - 4.500 uIU/mL Final       No results found for: CHOL  Lab Results   Component Value Date    TRIG 160 (H) 02/15/2022     Lab Results   Component Value Date    HDL 35 (L) 02/15/2022     Lab Results   Component Value Date     (H) 02/15/2022     Lab Results   Component Value Date    VLDL 29 02/15/2022     No results found for: LDLHDL      Procedures    Assessment & Plan   Problems Addressed this Visit     Anxiety    Relevant Medications    FLUoxetine (PROzac) 40 MG capsule    Hypertension, essential - Primary    Relevant Medications    lisinopril (PRINIVIL,ZESTRIL) 20 MG tablet    Other Relevant Orders    Comprehensive Metabolic Panel    Hyperlipemia, mixed    Relevant Orders    Lipid Panel    Other fatigue    MELISSA on CPAP      Diagnoses       Codes Comments    Hypertension, essential    -  Primary ICD-10-CM: I10  ICD-9-CM: 401.9     Anxiety     ICD-10-CM: F41.9  ICD-9-CM: 300.00     MELISSA on CPAP     ICD-10-CM: G47.33, Z99.89  ICD-9-CM: 327.23, V46.8     Other fatigue     ICD-10-CM: R53.83  ICD-9-CM: 780.79     Hyperlipemia, mixed     ICD-10-CM: E78.2  ICD-9-CM: 272.2           Orders Placed This Encounter   Procedures   • Lipid Panel   • Comprehensive Metabolic Panel     Order Specific Question:   Release to patient     Answer:   Routine Release       Current Outpatient Medications   Medication Sig Dispense Refill   • FLUoxetine (PROzac) 40 MG capsule Take 1 capsule by mouth Daily. 90 capsule 1   • lisinopril  (PRINIVIL,ZESTRIL) 20 MG tablet Take 1 tablet by mouth every night at bedtime. 90 tablet 1   • multivitamin with minerals tablet tablet Take 1 tablet by mouth Daily.     • sildenafil (REVATIO) 20 MG tablet Take one to five tablets one hour prior to event 60 tablet 5     No current facility-administered medications for this visit.       No follow-ups on file.    There are no Patient Instructions on file for this visit.         Time spent on visit:  25   minutes.  This patient has consented to a telehealth visit via video. The visit was scheduled as a video visit to comply with patient safety concerns in accordance with CDC recommendations.  All vitals recorded within this visit are reported by the patient.      Refills and labs-patient will return for these..  Work on diet and exercise.    After few weeks of being on the CPAP machine patient will let me know how his fatigue is doing.  If still present we may make some adjustments on his anxiety medication.  Continue rest of plan.

## 2022-10-16 DIAGNOSIS — N52.8 OTHER MALE ERECTILE DYSFUNCTION: ICD-10-CM

## 2022-10-18 RX ORDER — SILDENAFIL CITRATE 20 MG/1
TABLET ORAL
Qty: 60 TABLET | Refills: 5 | Status: SHIPPED | OUTPATIENT
Start: 2022-10-18 | End: 2022-10-24 | Stop reason: SDUPTHER

## 2022-10-18 NOTE — TELEPHONE ENCOUNTER
Rx Refill Note  Requested Prescriptions     Pending Prescriptions Disp Refills   • sildenafil (REVATIO) 20 MG tablet [Pharmacy Med Name: SILDENAFIL 20 MG TABLET] 60 tablet 5     Sig: TAKE 1-5 TABLETS BY MOUTH ONE HOUR PRIOR TO EVENT      Last office visit with prescribing clinician: 08/15/2022 TELEMEDICINE      Next office visit with prescribing clinician: Visit date not found  LAST REFILL 11/11/2021

## 2022-10-21 ENCOUNTER — TELEPHONE (OUTPATIENT)
Dept: FAMILY MEDICINE CLINIC | Facility: CLINIC | Age: 47
End: 2022-10-21

## 2022-10-21 NOTE — TELEPHONE ENCOUNTER
Patient called and states [facility] was supposed to be calling to get clarification on directions for the prescription of sildenafil.  They still haven't filled the prescription so he was just checking to make sure they have reached out and if not, if you could call them please.  His number is [phone].

## 2022-10-24 DIAGNOSIS — N52.8 OTHER MALE ERECTILE DYSFUNCTION: ICD-10-CM

## 2022-10-24 RX ORDER — SILDENAFIL CITRATE 20 MG/1
TABLET ORAL
Qty: 60 TABLET | Refills: 5 | Status: SHIPPED | OUTPATIENT
Start: 2022-10-24 | End: 2022-10-28 | Stop reason: SDUPTHER

## 2022-10-24 NOTE — TELEPHONE ENCOUNTER
Caller: Chandu Headley    Relationship: Self    Best call back number: [phone]    What was the call regarding: PHARMACY IS STILL WAITING ON DR ZAPATA TO [facility] ON DOSAGE AMOUNTS, PLEASE CALL TO VERIFY SO THEY CAN FILL THE PRESCRIPTION.     [facility] [id number] - [place], [place] - [address] [facility] [address] [facility] - [phone]  - [phone] FX    Do you require a callback: YES, PLEASE ADVISE WHEN TAKEN CARE OF.

## 2022-10-26 NOTE — TELEPHONE ENCOUNTER
LMTCB    Ok for HUB to read      It was sent in with instructions on 10/18/22 and I just resent it.  Thanks.

## 2022-10-27 NOTE — TELEPHONE ENCOUNTER
Patient's wife called regarding the prescription for silldenafil.  She states she talked to someone earlier about picking up a written prescription since they are still unable to  the prescription that was sent over electronically, but she hasn't heard anything.   She said the instructions sent to [facility] needs to say how many pills per day max that he can take.  She would like a phone call back please.  Her number is [phone].

## 2022-10-27 NOTE — TELEPHONE ENCOUNTER
PATIENTS WIFE IS CALLING IN THEY ARE WANTING TO GET THIS MEDICATION SENT TO [facility] AND NOT THE [facility].      PLEASE ADVISE  CALLBACK NUMBER IS  [phone]      CONFIRMED PHARMACY  [facility] [id number] - [place] [address] AT [facility] [address]  [phone]  - [phone] FX

## 2022-10-28 DIAGNOSIS — N52.8 OTHER MALE ERECTILE DYSFUNCTION: ICD-10-CM

## 2022-10-28 RX ORDER — SILDENAFIL CITRATE 20 MG/1
TABLET ORAL
Qty: 60 TABLET | Refills: 5 | Status: SHIPPED | OUTPATIENT
Start: 2022-10-28

## 2022-10-28 RX ORDER — SILDENAFIL CITRATE 20 MG/1
TABLET ORAL
Qty: 60 TABLET | Refills: 5 | Status: SHIPPED | OUTPATIENT
Start: 2022-10-28 | End: 2022-10-28

## 2022-10-28 NOTE — TELEPHONE ENCOUNTER
LMTCB    Ok for HUB to read      Called to inform patient that when Dr. Skaggs sent this to [facility] there were instructions on the prescription so we are unsure why they are not filling it for him. We will get it sent to [facility] with the same instructions that [facility] had on file.

## 2022-11-14 ENCOUNTER — APPOINTMENT (OUTPATIENT)
Dept: SLEEP MEDICINE | Facility: HOSPITAL | Age: 47
End: 2022-11-14

## 2023-01-23 ENCOUNTER — OFFICE VISIT (OUTPATIENT)
Dept: SLEEP MEDICINE | Facility: HOSPITAL | Age: 48
End: 2023-01-23
Payer: COMMERCIAL

## 2023-01-23 VITALS
HEIGHT: 76 IN | OXYGEN SATURATION: 95 % | BODY MASS INDEX: 28.74 KG/M2 | DIASTOLIC BLOOD PRESSURE: 87 MMHG | HEART RATE: 60 BPM | SYSTOLIC BLOOD PRESSURE: 138 MMHG | WEIGHT: 236 LBS

## 2023-01-23 DIAGNOSIS — G47.33 OBSTRUCTIVE SLEEP APNEA, ADULT: Primary | ICD-10-CM

## 2023-01-23 PROCEDURE — G0463 HOSPITAL OUTPT CLINIC VISIT: HCPCS

## 2023-01-23 NOTE — PROGRESS NOTES
[facility]                          Chief Complaint:   F/up EMLISSA, Parasomnia    History of present illness:   Subjective     This is a 47 year old male patient here today to discuss the result of the sleep study.    Patient complains of night terror described as vivid dreams associated with body movements.  Additional symptoms include loud snoring.  Pertinent medications include Prozac.     PSG May 2022: AHI 5.3/H (hypopneas).  PLM 10/H.    Started CPAP therapy September 2022.  Reported using his machine regularly.  Dream enactment 30-50% better. Events now described as mostly flailing arms and legs and talking in his sleep.     Sleep schedule:: 12-12 30- 8 AM    DME: [employer]  Mask: Dreamwear [facility].       ESS: Total score: 7       He stated that he has been on Prozac for a while but even prior to being on Prozac, he had issues with nightmares and acting out his dreams.  This has been going on for years and started when he was in his 20s..    Events occurs twice a week on average and have occasionally resulted in mild injuries to himself and his partner.  No self injuries or injury to his partners.    REVIEW OF SYSTEMS:   HEENT: No nasal congestion or postnasal drip   CARDIOVASCULAR: No chest pain, chest pressure or chest discomfort. No palpitations or edema.   RESPIRATORY: No shortness of breath, cough or sputum.   GASTROINTESTINAL: No abdominal bloating or reflux   NEUROLOGICAL/PSYCHOATRY: Anxiety and depression    Past Medical History:  Past Medical History:   Diagnosis Date   • Allergic rhinitis    • Anxiety    • High risk medication use    • Onychomycosis of toenail    • Panic disorder with agoraphobia    , No past surgical history on file.,   Family History   Problem Relation Age of Onset   • No Known Problems Mother    • Arthritis Paternal Grandmother    ,   Social History     Socioeconomic History   • Marital status:    Tobacco Use   • Smoking status: Former   • Smokeless  "tobacco: Never   Substance and Sexual Activity   • Alcohol use: Yes   • Drug use: No     Family History   Problem Relation Age of Onset   • No Known Problems Mother    • Arthritis Paternal Grandmother         and Allergies:  Penicillins    Medication Review:     Current Outpatient Medications:   •  FLUoxetine (PROzac) 40 MG capsule, Take 1 capsule by mouth Daily., Disp: 90 capsule, Rfl: 1  •  lisinopril (PRINIVIL,ZESTRIL) 20 MG tablet, Take 1 tablet by mouth every night at bedtime., Disp: 90 tablet, Rfl: 1  •  multivitamin with minerals tablet tablet, Take 1 tablet by mouth Daily., Disp: , Rfl:   •  sildenafil (REVATIO) 20 MG tablet, TAKE 1-5 TABLETS BY MOUTH ONE HOUR PRIOR TO EVENT, Disp: 60 tablet, Rfl: 5      Objective   Vital Signs:  Vitals:    01/23/23 1110   BP: 138/87   Pulse: 60   SpO2: 95%   Weight: 107 kg (236 lb)   Height: 193 cm (75.98\")     Body mass index is 28.74 kg/m².  Neck Circumference: 15.5 inches       Physical Exam:   General Appearance:    Alert, cooperative, in no acute distress   ENMT:  Freidman score 2, narrow distance in between the posterior pharyngeal pillars   Eyes  injected conjunctivo-.  EOMI   Neck:   Trachea midline. No thyromegaly.   Lungs:     Clear to auscultation,respirations regular, even and                  unlabored    Heart:    Regular rhythm and normal rate, normal S1 and S2, no            Murmur.   Abdomen:    Overweight.  Soft.  No tenderness.  No HSM    Integumentary:  No rash or ecchymosis   Neuro:   Conscious, alert, oriented x3. Appropriate mood and affect.    Lymphatics  no palpable cervical supraclavicular lymph nodes   Extremities:   Moves all extremities well, no edema, no cyanosis, no             Redness              Diagnostic data:    Triglycerides   Date Value Ref Range Status   02/15/2022 160 (H) 0 - 149 mg/dL Final     HDL Cholesterol   Date Value Ref Range Status   02/15/2022 35 (L) >39 mg/dL Final     Hemoglobin   Date Value Ref Range Status   11/11/2021 " 17.5 13.0 - 17.7 g/dL Final     Total CO2   Date Value Ref Range Status   02/15/2022 22 20 - 29 mmol/L Final       No results found for: HGBA1C  Triglycerides   Date Value Ref Range Status   02/15/2022 160 (H) 0 - 149 mg/dL Final     HDL Cholesterol   Date Value Ref Range Status   02/15/2022 35 (L) >39 mg/dL Final     Hemoglobin   Date Value Ref Range Status   11/11/2021 17.5 13.0 - 17.7 g/dL Final     Total CO2   Date Value Ref Range Status   02/15/2022 22 20 - 29 mmol/L Final        Assessment   1. MELISSA, mid  2. Parasomnia, clinically suggestive of RBD but not evident on testing  3. Inadequate sleep hygiene          PLAN:    Borderline compliant with therapy. We discussed the download. His sleep apnea is well controlled. We discussed using his machine regularly and throughout the night.    Start Melatonin 10 mg at night for possible RBD although his prior PSG did not show evidence of loss of REM atonia.    Discussed sleep hygiene.  Encouraged to go to bed early and cut down on caffeine intake and do not consume caffeine after 2 PM.    Discuss potentially performing psychotherapy for his anxiety.  That may help him come off Prozac which could be contributing or enabling his dream enactment.            This note was dictated utilizing Dragon dictation

## 2023-01-30 DIAGNOSIS — I10 HYPERTENSION, ESSENTIAL: ICD-10-CM

## 2023-01-30 RX ORDER — LISINOPRIL 20 MG/1
20 TABLET ORAL
Qty: 90 TABLET | Refills: 1 | OUTPATIENT
Start: 2023-01-30

## 2023-01-30 NOTE — TELEPHONE ENCOUNTER
Rx Refill Note  Requested Prescriptions     Pending Prescriptions Disp Refills   • lisinopril (PRINIVIL,ZESTRIL) 20 MG tablet 90 tablet 1     Sig: Take 1 tablet by mouth every night at bedtime.      Last office visit with prescribing clinician: 8/15/2022   Last telemedicine visit with prescribing clinician: 8/15/2022  Next office visit with prescribing clinician: Visit date not found                         Would you like a call back once the refill request has been completed: [] Yes [] No    If the office needs to give you a call back, can they leave a voicemail: [] Yes [] No    Joe Zhu, SAVANA/LMR  01/30/23, 09:27 EST

## 2023-01-31 DIAGNOSIS — I10 HYPERTENSION, ESSENTIAL: ICD-10-CM

## 2023-01-31 RX ORDER — LISINOPRIL 20 MG/1
TABLET ORAL
Qty: 30 TABLET | Refills: 5 | OUTPATIENT
Start: 2023-01-31

## 2023-01-31 NOTE — TELEPHONE ENCOUNTER
Rx Refill Note  Requested Prescriptions     Pending Prescriptions Disp Refills   • lisinopril (PRINIVIL,ZESTRIL) 20 MG tablet [Pharmacy Med Name: LISINOPRIL 20 MG TABLET] 30 tablet 5     Sig: TAKE 1 TABLET BY MOUTH EVERYDAY AT BEDTIME      Last office visit with prescribing clinician:08/15/2022 TELEMED  Last telemedicine visit with prescribing clinician: Visit date not found   Next office visit with prescribing clinician: Visit date not found

## 2023-02-08 DIAGNOSIS — I10 HYPERTENSION, ESSENTIAL: ICD-10-CM

## 2023-02-08 RX ORDER — LISINOPRIL 20 MG/1
20 TABLET ORAL
Qty: 90 TABLET | Refills: 0 | OUTPATIENT
Start: 2023-02-08

## 2023-02-28 ENCOUNTER — OFFICE VISIT (OUTPATIENT)
Dept: FAMILY MEDICINE CLINIC | Facility: CLINIC | Age: 48
End: 2023-02-28
Payer: COMMERCIAL

## 2023-02-28 VITALS
RESPIRATION RATE: 16 BRPM | HEART RATE: 75 BPM | WEIGHT: 234 LBS | TEMPERATURE: 96.9 F | DIASTOLIC BLOOD PRESSURE: 82 MMHG | BODY MASS INDEX: 28.49 KG/M2 | HEIGHT: 76 IN | SYSTOLIC BLOOD PRESSURE: 124 MMHG | OXYGEN SATURATION: 95 %

## 2023-02-28 DIAGNOSIS — I10 HYPERTENSION, ESSENTIAL: Primary | ICD-10-CM

## 2023-02-28 DIAGNOSIS — E78.2 HYPERLIPEMIA, MIXED: ICD-10-CM

## 2023-02-28 PROCEDURE — 99214 OFFICE O/P EST MOD 30 MIN: CPT | Performed by: FAMILY MEDICINE

## 2023-02-28 RX ORDER — LISINOPRIL 20 MG/1
20 TABLET ORAL
Qty: 90 TABLET | Refills: 1 | Status: SHIPPED | OUTPATIENT
Start: 2023-02-28

## 2023-02-28 NOTE — PROGRESS NOTES
"Chief Complaint  Hypertension    Subjective        Chandu Headley presents to [facility]  History of Present Illness  PT is here for refills, labs and  HTN- no CP or HA or blurred vision  HLD- no med;  No exercise.  Anxiety- stable with med;  No depression.  No HI or SI  No other complaints.    Objective   Vital Signs:  /82 (BP Location: Right arm, Patient Position: Sitting, Cuff Size: Adult)   Pulse 75   Temp 96.9 °F (36.1 °C) (Temporal)   Resp 16   Ht 193 cm (75.98\")   Wt 106 kg (234 lb)   SpO2 95%   BMI 28.50 kg/m²   Estimated body mass index is 28.5 kg/m² as calculated from the following:    Height as of this encounter: 193 cm (75.98\").    Weight as of this encounter: 106 kg (234 lb).       BMI is >= 25 and <30. (Overweight) The following options were offered after discussion;: weight loss educational material (shared in after visit summary)      Physical Exam  Vitals and nursing note reviewed.   Constitutional:       Appearance: Normal appearance. He is well-developed.   Cardiovascular:      Rate and Rhythm: Normal rate and regular rhythm.      Heart sounds: Normal heart sounds. No murmur heard.  Pulmonary:      Effort: Pulmonary effort is normal. No respiratory distress.      Breath sounds: Normal breath sounds. No stridor. No wheezing or rhonchi.   Neurological:      General: No focal deficit present.      Mental Status: He is alert and oriented to person, place, and time. He is not disoriented.   Psychiatric:         Mood and Affect: Mood normal.         Behavior: Behavior normal.        Result Review :                   Assessment and Plan   Diagnoses and all orders for this visit:    1. Hypertension, essential (Primary)  -     Comprehensive Metabolic Panel  -     lisinopril (PRINIVIL,ZESTRIL) 20 MG tablet; Take 1 tablet by mouth every night at bedtime.  Dispense: 90 tablet; Refill: 1    2. Hyperlipemia, mixed  -     Lipid Panel             Follow Up   Return in " about 6 months (around 8/28/2023) for hypertension, hyperlipidema.  Patient was given instructions and counseling regarding his condition or for health maintenance advice. Please see specific information pulled into the AVS if appropriate.     Refills, labs and work on diet and exercise.  Follow up in 6 months.   He will think about cscope and let me know.

## 2023-03-01 LAB
ALBUMIN SERPL-MCNC: 4.6 G/DL (ref 3.5–5.2)
ALBUMIN/GLOB SERPL: 2.4 G/DL
ALP SERPL-CCNC: 64 U/L (ref 39–117)
ALT SERPL-CCNC: 30 U/L (ref 1–41)
AST SERPL-CCNC: 22 U/L (ref 1–40)
BILIRUB SERPL-MCNC: 0.8 MG/DL (ref 0–1.2)
BUN SERPL-MCNC: 11 MG/DL (ref 6–20)
BUN/CREAT SERPL: 12 (ref 7–25)
CALCIUM SERPL-MCNC: 9.3 MG/DL (ref 8.6–10.5)
CHLORIDE SERPL-SCNC: 105 MMOL/L (ref 98–107)
CHOLEST SERPL-MCNC: 170 MG/DL (ref 0–200)
CO2 SERPL-SCNC: 29 MMOL/L (ref 22–29)
CREAT SERPL-MCNC: 0.92 MG/DL (ref 0.76–1.27)
EGFRCR SERPLBLD CKD-EPI 2021: 103.9 ML/MIN/1.73
GLOBULIN SER CALC-MCNC: 1.9 GM/DL
GLUCOSE SERPL-MCNC: 99 MG/DL (ref 65–99)
HDLC SERPL-MCNC: 39 MG/DL (ref 40–60)
LDLC SERPL CALC-MCNC: 107 MG/DL (ref 0–100)
POTASSIUM SERPL-SCNC: 4.4 MMOL/L (ref 3.5–5.2)
PROT SERPL-MCNC: 6.5 G/DL (ref 6–8.5)
SODIUM SERPL-SCNC: 143 MMOL/L (ref 136–145)
TRIGL SERPL-MCNC: 136 MG/DL (ref 0–150)
VLDLC SERPL CALC-MCNC: 24 MG/DL (ref 5–40)

## 2023-03-02 DIAGNOSIS — F41.9 ANXIETY: ICD-10-CM

## 2023-03-02 RX ORDER — FLUOXETINE HYDROCHLORIDE 40 MG/1
CAPSULE ORAL
Qty: 90 CAPSULE | Refills: 1 | Status: SHIPPED | OUTPATIENT
Start: 2023-03-02

## 2023-09-08 DIAGNOSIS — I10 HYPERTENSION, ESSENTIAL: ICD-10-CM

## 2023-09-08 DIAGNOSIS — F41.9 ANXIETY: ICD-10-CM

## 2023-09-08 RX ORDER — FLUOXETINE HYDROCHLORIDE 40 MG/1
CAPSULE ORAL
Qty: 30 CAPSULE | Refills: 5 | Status: SHIPPED | OUTPATIENT
Start: 2023-09-08

## 2023-09-08 RX ORDER — LISINOPRIL 20 MG/1
TABLET ORAL
Qty: 90 TABLET | Refills: 1 | Status: SHIPPED | OUTPATIENT
Start: 2023-09-08

## 2023-11-08 DIAGNOSIS — N52.8 OTHER MALE ERECTILE DYSFUNCTION: ICD-10-CM

## 2023-11-09 ENCOUNTER — TELEPHONE (OUTPATIENT)
Dept: FAMILY MEDICINE CLINIC | Facility: CLINIC | Age: 48
End: 2023-11-09
Payer: COMMERCIAL

## 2023-11-09 NOTE — TELEPHONE ENCOUNTER
Left message to call back for: Said, patient's son.   Information to relay to patient:  Bone density test shows significant osteoporosis (bone thinning) in her spine which increases risk for compression fracture. It is imporatnt that she takes her Fosamax medication once a week regularly for several years without stopping. It will slow bone loss. Continue vitamin D supplements. And take calcium citrate supplement 500 mg a day. I sent script in.   OK to relay:    Lvm for pt, he needs to make an appointment before we can refill.

## 2023-11-14 DIAGNOSIS — N52.8 OTHER MALE ERECTILE DYSFUNCTION: ICD-10-CM

## 2023-11-14 RX ORDER — SILDENAFIL CITRATE 20 MG/1
TABLET ORAL
Qty: 60 TABLET | Refills: 0 | OUTPATIENT
Start: 2023-11-14

## 2023-11-14 NOTE — TELEPHONE ENCOUNTER
PATIENT IS CALLING TO CHECK THE STATUS OF HIS REQUEST FOR A REFILL OF THE FOLLOWING MEDICATION.  PATIENT STATES HE IS COMPLETELY OUT OF THE MEDICATION.    PATIENT CALL BACK     [phone]          sildenafil (REVATIO) 20 MG tablet   [id number] - [place], [place] - [address] - [phone]  - [phone]       PLEASE ADVISE.

## 2023-11-14 NOTE — TELEPHONE ENCOUNTER
LOV             2/28/2023   NOV             6 months (around 8/28/2023) for hypertension, hyperlipidema.   CANCELLED APPT FOR 8/28/23  Last RF       10/28/22  Protocol      NOT MET    MUST BE SEEN BEFORE GETTING ANY REFILLS     HARI Clark/SEAN

## 2023-11-15 RX ORDER — SILDENAFIL CITRATE 20 MG/1
TABLET ORAL
Qty: 60 TABLET | Refills: 0 | OUTPATIENT
Start: 2023-11-15

## 2024-02-15 DIAGNOSIS — N52.8 OTHER MALE ERECTILE DYSFUNCTION: ICD-10-CM

## 2024-02-16 RX ORDER — SILDENAFIL CITRATE 20 MG/1
TABLET ORAL
Qty: 60 TABLET | Refills: 5 | Status: SHIPPED | OUTPATIENT
Start: 2024-02-16 | End: 2024-02-19 | Stop reason: SDUPTHER

## 2024-02-19 DIAGNOSIS — N52.8 OTHER MALE ERECTILE DYSFUNCTION: ICD-10-CM

## 2024-02-19 RX ORDER — SILDENAFIL CITRATE 20 MG/1
TABLET ORAL
Qty: 60 TABLET | Refills: 5 | Status: SHIPPED | OUTPATIENT
Start: 2024-02-19

## 2024-02-22 ENCOUNTER — TELEPHONE (OUTPATIENT)
Dept: FAMILY MEDICINE CLINIC | Facility: CLINIC | Age: 49
End: 2024-02-22

## 2024-02-22 NOTE — TELEPHONE ENCOUNTER
OK to relay:    LMTCB    Please let pt know that we DO NOT do PA's for ED medication. I have informed the pharmacy as well. You can use good rx to help with the cost of the rx.

## 2024-02-27 ENCOUNTER — OFFICE VISIT (OUTPATIENT)
Dept: FAMILY MEDICINE CLINIC | Facility: CLINIC | Age: 49
End: 2024-02-27
Payer: COMMERCIAL

## 2024-02-27 VITALS
HEIGHT: 76 IN | TEMPERATURE: 97.8 F | HEART RATE: 64 BPM | OXYGEN SATURATION: 98 % | BODY MASS INDEX: 28.25 KG/M2 | WEIGHT: 232 LBS | DIASTOLIC BLOOD PRESSURE: 90 MMHG | SYSTOLIC BLOOD PRESSURE: 148 MMHG | RESPIRATION RATE: 18 BRPM

## 2024-02-27 DIAGNOSIS — F41.9 ANXIETY: ICD-10-CM

## 2024-02-27 DIAGNOSIS — F40.01 PANIC DISORDER WITH AGORAPHOBIA: ICD-10-CM

## 2024-02-27 DIAGNOSIS — I10 HYPERTENSION, ESSENTIAL: Primary | ICD-10-CM

## 2024-02-27 DIAGNOSIS — E78.2 HYPERLIPEMIA, MIXED: ICD-10-CM

## 2024-02-27 DIAGNOSIS — N52.8 OTHER MALE ERECTILE DYSFUNCTION: ICD-10-CM

## 2024-02-27 DIAGNOSIS — Z12.11 SCREEN FOR COLON CANCER: ICD-10-CM

## 2024-02-27 RX ORDER — TADALAFIL 5 MG/1
5 TABLET ORAL DAILY
Qty: 30 TABLET | Refills: 3 | Status: SHIPPED | OUTPATIENT
Start: 2024-02-27

## 2024-02-27 RX ORDER — LISINOPRIL AND HYDROCHLOROTHIAZIDE 25; 20 MG/1; MG/1
1 TABLET ORAL DAILY
Qty: 30 TABLET | Refills: 3 | Status: SHIPPED | OUTPATIENT
Start: 2024-02-27

## 2024-02-27 RX ORDER — FLUOXETINE HYDROCHLORIDE 20 MG/1
20 CAPSULE ORAL DAILY
Qty: 30 CAPSULE | Refills: 1 | Status: SHIPPED | OUTPATIENT
Start: 2024-02-27

## 2024-02-27 RX ORDER — TADALAFIL 5 MG/1
1 TABLET ORAL DAILY
COMMUNITY
Start: 2024-01-11 | End: 2024-02-27 | Stop reason: SDUPTHER

## 2024-02-27 NOTE — PROGRESS NOTES
"Chief Complaint  OTHER (Medication changes )    Subjective        Chandu Headley presents to [facility]  History of Present Illness  Pt presents today for refills, labs and  HTN- no CP or HA  ED wants to get cialis  Anxiety- wants to ween off this med.  No HI or SI.  HLD no med for this.    Patient is still having issues with agoraphobia.  He has tried propranolol in the past and it has not helped.  He is wondering about cognitive therapy.  Objective   Vital Signs:  /90 (BP Location: Left arm, Patient Position: Sitting, Cuff Size: Large Adult)   Pulse 64   Temp 97.8 °F (36.6 °C) (Tympanic)   Resp 18   Ht 193 cm (75.98\")   Wt 105 kg (232 lb)   SpO2 98%   BMI 28.25 kg/m²   Estimated body mass index is 28.25 kg/m² as calculated from the following:    Height as of this encounter: 193 cm (75.98\").    Weight as of this encounter: 105 kg (232 lb).               Physical Exam  Vitals and nursing note reviewed.   Constitutional:       Appearance: Normal appearance. He is well-developed.   Cardiovascular:      Rate and Rhythm: Normal rate and regular rhythm.      Heart sounds: Normal heart sounds. No murmur heard.  Pulmonary:      Effort: Pulmonary effort is normal. No respiratory distress.      Breath sounds: Normal breath sounds. No stridor. No wheezing or rhonchi.   Neurological:      General: No focal deficit present.      Mental Status: He is alert and oriented to person, place, and time. He is not disoriented.   Psychiatric:         Mood and Affect: Mood normal.         Behavior: Behavior normal.        Result Review :                     Assessment and Plan     Diagnoses and all orders for this visit:    1. Hypertension, essential (Primary)  -     lisinopril-hydrochlorothiazide (PRINZIDE,ZESTORETIC) 20-25 MG per tablet; Take 1 tablet by mouth Daily.  Dispense: 30 tablet; Refill: 3  -     Comprehensive Metabolic Panel    2. Hyperlipemia, mixed  -     Lipid Panel    3. Other " male erectile dysfunction  -     tadalafil (CIALIS) 5 MG tablet; Take 1 tablet by mouth Daily.  Dispense: 30 tablet; Refill: 3    4. Screen for colon cancer  -     Ambulatory Referral For Screening Colonoscopy    5. Anxiety  -     FLUoxetine (PROzac) 20 MG capsule; Take 1 capsule by mouth Daily.  Dispense: 30 capsule; Refill: 1    6. Panic disorder with agoraphobia  -     Ambulatory Referral to Psychology             Follow Up     Return in about 3 months (around 5/27/2024) for hypertension, hyperlipidema.  Patient was given instructions and counseling regarding his condition or for health maintenance advice. Please see specific information pulled into the AVS if appropriate.       Patient to monitor BP over next week and call me with readings at that time and we can make adjustments accordingly if needed.  Given warning signs for stroke and MI.  Will add hctz to lisinopril.  SE discussed.    Take 20 mg for a week and then 20 mg every other day.  If you tolerate that well, ok to stop completely.    If at any point during weaning you are unable to tolerate the dosage, go up to the previous dosage.         Psychology referral placed.  I have given him a list of therapist that he can call and set up an appointment with.

## 2024-02-28 LAB
ALBUMIN SERPL-MCNC: 4.7 G/DL (ref 3.5–5.2)
ALBUMIN/GLOB SERPL: 2.6 G/DL
ALP SERPL-CCNC: 62 U/L (ref 39–117)
ALT SERPL-CCNC: 26 U/L (ref 1–41)
AST SERPL-CCNC: 20 U/L (ref 1–40)
BILIRUB SERPL-MCNC: 1 MG/DL (ref 0–1.2)
BUN SERPL-MCNC: 10 MG/DL (ref 6–20)
BUN/CREAT SERPL: 10.5 (ref 7–25)
CALCIUM SERPL-MCNC: 9.7 MG/DL (ref 8.6–10.5)
CHLORIDE SERPL-SCNC: 103 MMOL/L (ref 98–107)
CHOLEST SERPL-MCNC: 185 MG/DL (ref 0–200)
CO2 SERPL-SCNC: 25.3 MMOL/L (ref 22–29)
CREAT SERPL-MCNC: 0.95 MG/DL (ref 0.76–1.27)
EGFRCR SERPLBLD CKD-EPI 2021: 99.3 ML/MIN/1.73
GLOBULIN SER CALC-MCNC: 1.8 GM/DL
GLUCOSE SERPL-MCNC: 118 MG/DL (ref 65–99)
HDLC SERPL-MCNC: 41 MG/DL (ref 40–60)
LDLC SERPL CALC-MCNC: 107 MG/DL (ref 0–100)
POTASSIUM SERPL-SCNC: 4 MMOL/L (ref 3.5–5.2)
PROT SERPL-MCNC: 6.5 G/DL (ref 6–8.5)
SODIUM SERPL-SCNC: 141 MMOL/L (ref 136–145)
TRIGL SERPL-MCNC: 215 MG/DL (ref 0–150)
VLDLC SERPL CALC-MCNC: 37 MG/DL (ref 5–40)

## 2024-03-12 DIAGNOSIS — F41.9 ANXIETY: ICD-10-CM

## 2024-03-12 RX ORDER — FLUOXETINE HYDROCHLORIDE 40 MG/1
CAPSULE ORAL
Qty: 30 CAPSULE | Refills: 5 | OUTPATIENT
Start: 2024-03-12

## 2024-04-01 DIAGNOSIS — N52.8 OTHER MALE ERECTILE DYSFUNCTION: ICD-10-CM

## 2024-04-01 DIAGNOSIS — I10 HYPERTENSION, ESSENTIAL: ICD-10-CM

## 2024-04-02 DIAGNOSIS — F41.9 ANXIETY: ICD-10-CM

## 2024-04-02 RX ORDER — LISINOPRIL AND HYDROCHLOROTHIAZIDE 25; 20 MG/1; MG/1
1 TABLET ORAL DAILY
Qty: 30 TABLET | Refills: 3 | Status: SHIPPED | OUTPATIENT
Start: 2024-04-02

## 2024-04-02 RX ORDER — FLUOXETINE HYDROCHLORIDE 40 MG/1
40 CAPSULE ORAL DAILY
Qty: 90 CAPSULE | Refills: 1 | Status: SHIPPED | OUTPATIENT
Start: 2024-04-02

## 2024-04-02 RX ORDER — TADALAFIL 5 MG/1
5 TABLET ORAL DAILY
Qty: 30 TABLET | Refills: 3 | Status: SHIPPED | OUTPATIENT
Start: 2024-04-02

## 2024-04-03 ENCOUNTER — PREP FOR SURGERY (OUTPATIENT)
Dept: OTHER | Facility: HOSPITAL | Age: 49
End: 2024-04-03
Payer: COMMERCIAL

## 2024-04-03 DIAGNOSIS — Z12.11 SCREEN FOR COLON CANCER: Primary | ICD-10-CM

## 2024-07-16 PROBLEM — Z12.11 SCREEN FOR COLON CANCER: Status: ACTIVE | Noted: 2024-04-03

## 2024-08-15 ENCOUNTER — HOSPITAL ENCOUNTER (OUTPATIENT)
Facility: HOSPITAL | Age: 49
Setting detail: HOSPITAL OUTPATIENT SURGERY
Discharge: HOME | End: 2024-08-15
Attending: SURGERY | Admitting: SURGERY
Payer: COMMERCIAL

## 2024-08-15 ENCOUNTER — ANESTHESIA EVENT (OUTPATIENT)
Dept: GASTROENTEROLOGY | Facility: HOSPITAL | Age: 49
End: 2024-08-15
Payer: COMMERCIAL

## 2024-08-15 ENCOUNTER — ANESTHESIA (OUTPATIENT)
Dept: GASTROENTEROLOGY | Facility: HOSPITAL | Age: 49
End: 2024-08-15
Payer: COMMERCIAL

## 2024-08-15 VITALS
WEIGHT: 241.4 LBS | BODY MASS INDEX: 29.4 KG/M2 | OXYGEN SATURATION: 96 % | DIASTOLIC BLOOD PRESSURE: 75 MMHG | HEIGHT: 76 IN | HEART RATE: 54 BPM | RESPIRATION RATE: 20 BRPM | SYSTOLIC BLOOD PRESSURE: 121 MMHG

## 2024-08-15 PROCEDURE — 25010000002 PROPOFOL 1000 MG/100ML EMULSION: Performed by: NURSE ANESTHETIST, CERTIFIED REGISTERED

## 2024-08-15 PROCEDURE — 25810000003 LACTATED RINGERS PER 1000 ML: Performed by: NURSE ANESTHETIST, CERTIFIED REGISTERED

## 2024-08-15 PROCEDURE — 25810000003 LACTATED RINGERS PER 1000 ML: Performed by: SURGERY

## 2024-08-15 PROCEDURE — 25010000002 PROPOFOL 200 MG/20ML EMULSION: Performed by: NURSE ANESTHETIST, CERTIFIED REGISTERED

## 2024-08-15 PROCEDURE — 45378 DIAGNOSTIC COLONOSCOPY: CPT | Performed by: SURGERY

## 2024-08-15 RX ORDER — SODIUM CHLORIDE, SODIUM LACTATE, POTASSIUM CHLORIDE, CALCIUM CHLORIDE 600; 310; 30; 20 MG/100ML; MG/100ML; MG/100ML; MG/100ML
30 INJECTION, SOLUTION INTRAVENOUS CONTINUOUS PRN
Status: DISCONTINUED | OUTPATIENT
Start: 2024-08-15 | End: 2024-08-15 | Stop reason: HOSPADM

## 2024-08-15 RX ORDER — PROPOFOL 10 MG/ML
INJECTION, EMULSION INTRAVENOUS AS NEEDED
Status: DISCONTINUED | OUTPATIENT
Start: 2024-08-15 | End: 2024-08-15 | Stop reason: SURG

## 2024-08-15 RX ORDER — PROPOFOL 10 MG/ML
INJECTION, EMULSION INTRAVENOUS CONTINUOUS PRN
Status: DISCONTINUED | OUTPATIENT
Start: 2024-08-15 | End: 2024-08-15 | Stop reason: SURG

## 2024-08-15 RX ORDER — LIDOCAINE HYDROCHLORIDE 20 MG/ML
INJECTION, SOLUTION INFILTRATION; PERINEURAL AS NEEDED
Status: DISCONTINUED | OUTPATIENT
Start: 2024-08-15 | End: 2024-08-15 | Stop reason: SURG

## 2024-08-15 RX ORDER — SODIUM CHLORIDE, SODIUM LACTATE, POTASSIUM CHLORIDE, CALCIUM CHLORIDE 600; 310; 30; 20 MG/100ML; MG/100ML; MG/100ML; MG/100ML
INJECTION, SOLUTION INTRAVENOUS CONTINUOUS PRN
Status: DISCONTINUED | OUTPATIENT
Start: 2024-08-15 | End: 2024-08-15 | Stop reason: SURG

## 2024-08-15 RX ADMIN — SODIUM CHLORIDE, POTASSIUM CHLORIDE, SODIUM LACTATE AND CALCIUM CHLORIDE 30 ML/HR: 600; 310; 30; 20 INJECTION, SOLUTION INTRAVENOUS at 12:09

## 2024-08-15 RX ADMIN — SODIUM CHLORIDE, POTASSIUM CHLORIDE, SODIUM LACTATE AND CALCIUM CHLORIDE: 600; 310; 30; 20 INJECTION, SOLUTION INTRAVENOUS at 12:56

## 2024-08-15 RX ADMIN — PROPOFOL INJECTABLE EMULSION 20 MG: 10 INJECTION, EMULSION INTRAVENOUS at 13:14

## 2024-08-15 RX ADMIN — PROPOFOL INJECTABLE EMULSION 20 MG: 10 INJECTION, EMULSION INTRAVENOUS at 13:09

## 2024-08-15 RX ADMIN — PROPOFOL INJECTABLE EMULSION 70 MG: 10 INJECTION, EMULSION INTRAVENOUS at 12:59

## 2024-08-15 RX ADMIN — PROPOFOL 200 MCG/KG/MIN: 10 INJECTION, EMULSION INTRAVENOUS at 12:58

## 2024-08-15 RX ADMIN — LIDOCAINE HYDROCHLORIDE 60 MG: 20 INJECTION, SOLUTION INFILTRATION; PERINEURAL at 12:58

## 2024-08-15 RX ADMIN — PROPOFOL INJECTABLE EMULSION 50 MG: 10 INJECTION, EMULSION INTRAVENOUS at 13:11

## 2024-08-15 NOTE — ANESTHESIA PREPROCEDURE EVALUATION
Anesthesia Evaluation     Patient summary reviewed and Nursing notes reviewed                Airway   Mallampati: III  Narrow palate  Dental      Pulmonary    (+) a smoker Former,sleep apnea on CPAP  Cardiovascular     Rhythm: regular  Rate: normal    (+) hypertension, hyperlipidemia      Neuro/Psych  (+) psychiatric history Anxiety  GI/Hepatic/Renal/Endo    (+) obesity    Musculoskeletal (-) negative ROS    Abdominal    Substance History   (+) alcohol use     OB/GYN negative ob/gyn ROS         Other                      Anesthesia Plan    ASA 2     MAC     intravenous induction     Anesthetic plan, risks, benefits, and alternatives have been provided, discussed and informed consent has been obtained with: patient.    CODE STATUS:

## 2024-08-15 NOTE — ANESTHESIA POSTPROCEDURE EVALUATION
Patient: Chandu Headley    Procedure Summary       Date: 08/15/24 Room / Location: [facility] ENDOSCOPY 5 / [facility] ENDOSCOPY    Anesthesia Start: 1256 Anesthesia Stop: 1329    Procedure: COLONOSCOPY to cecum Diagnosis:       Screen for colon cancer      (Screen for colon cancer [Z12.11])    Surgeons: Mg Newman MD Provider: Lawrence Osuna MD    Anesthesia Type: MAC ASA Status: 2            Anesthesia Type: MAC    Vitals  Vitals Value Taken Time   /67 08/15/24 1329   Temp     Pulse 62 08/15/24 1329   Resp 16 08/15/24 1329   SpO2 96 % 08/15/24 1329           Post Anesthesia Care and Evaluation    Patient location during evaluation: PACU  Patient participation: complete - patient participated  Level of consciousness: awake and alert  Pain management: adequate    Airway patency: patent  Anesthetic complications: No anesthetic complications    Cardiovascular status: acceptable  Respiratory status: acceptable  Hydration status: acceptable    Comments: --------------------            08/15/24               1329     --------------------   BP:       101/67     Pulse:      62       Resp:       16       SpO2:      96%      --------------------

## 2024-08-15 NOTE — DISCHARGE INSTRUCTIONS
For the next 24 hours patient needs to be with a responsible adult.    For 24 hours DO NOT drive, operate machinery, appliances, drink alcohol, make important decisions or sign legal documents.    Start with a light or bland diet and advance to regular diet as tolerated.    Follow recommendations on procedure report provided by your doctor.    Call Dr Newman for problems [phone]    Problems may include but not limited to: large amounts of bleeding, trouble breathing, repeated vomiting, severe unrelieved pain, fever or chills.

## 2024-08-15 NOTE — H&P
SURGERY  Chandu [address] Matutehaider  1975    08/15/24    HPI: 48 y.o. male here for screening colonoscopy.  No prior history of colonoscopy.  He has a family history of colon cancer in a great grandfather.  He denies any first-degree relatives or secondary relatives with colon cancer history.    Past Medical History:   Diagnosis Date    Allergic rhinitis     Anxiety     High risk medication use     Onychomycosis of toenail     Panic disorder with agoraphobia     Sleep apnea        Past Surgical History:   Procedure Laterality Date    WISDOM TOOTH EXTRACTION         is allergic to penicillins.       Medication List        ASK your doctor about these medications      FLUoxetine 40 MG capsule  Commonly known as: PROzac  Take 1 capsule by mouth Daily.     lisinopril-hydrochlorothiazide 20-25 MG per tablet  Commonly known as: PRINZIDE,ZESTORETIC  Take 1 tablet by mouth Daily.     multivitamin with minerals tablet tablet     sildenafil 20 MG tablet  Commonly known as: REVATIO  TAKE 1-5 TABLETS BY MOUTH ONE HOUR PRIOR TO EVENT     tadalafil 5 MG tablet  Commonly known as: CIALIS  Take 1 tablet by mouth Daily.              Family History   Problem Relation Age of Onset    No Known Problems Mother     Arthritis Paternal Grandmother        Social History     Socioeconomic History    Marital status:    Tobacco Use    Smoking status: Former    Smokeless tobacco: Never   Vaping Use    Vaping status: Never Used   Substance and Sexual Activity    Alcohol use: Yes    Drug use: No       Vitals:    08/15/24 1203   BP: 120/76   Pulse: 61   Resp: 18   SpO2: 95%       Body mass index is 29.38 kg/m².    Physical Exam    General: No acute distress  Lungs: No labored breathing, Pulse oximetry on room air is 95%.  Heart: RRR  Abdo: Soft  Mental:  Awake, alert, and oriented      Assessment/Plan  Encounter for Screening Colonoscopy  Proceed with screening colonoscopy.  Risk, benefits discussed including risk of perforation,  bleeding.    Mg Newman MD  12:51 EDT

## 2024-08-15 NOTE — OP NOTE
Colonoscopy Procedure Note  Chandu Headley  1975  Date of Procedure: 08/15/24    Pre-operative Diagnosis:    Encounter for screening colonoscopy    Post-operative Diagnosis:  Sigmoid diverticulosis    Procedure: Colonoscopy to cecum    Findings/Treatments:   Multiple small and medium mouth diverticula in the sigmoid colon       Recommendations:   High-fiber diet.  Repeat colonoscopy for screening purposes in 10 years.    Surgeon: Mg Newman MD    Anesthetic: MAC per Lawrence Osuna MD      Procedure Details:    MAC anesthesia was induced.  A digital rectal exam was performed along with visual inspection of the anus. The 180 Colonoscope was inserted into the rectum and advanced to the cecum, with aid of abdominal pressure to intubate the cecum fully.  Cecum was identified by the appendiceal orifice and the ileocecal valve and photographed for documentation.       A careful inspection was made as the scope was withdrawn, including a retroflexed view of the rectum.  There were no encountered abnormalities of the entire examined colonic mucosa aside from diverticulosis evident in the sigmoid colon. Retroflexion in the rectum revealed no abnormalities. Prep quality was excellent.      Mg Newman M.D.  General, Endoscopic, and Robotic Surgery  [facility]    [address], [address]  [place], [place], [address]  P: [phone]  F: [phone]

## 2024-08-19 ENCOUNTER — TELEPHONE (OUTPATIENT)
Dept: FAMILY MEDICINE CLINIC | Facility: CLINIC | Age: 49
End: 2024-08-19
Payer: COMMERCIAL

## 2024-08-21 NOTE — TELEPHONE ENCOUNTER
Name: Chandu Headley      Relationship: Self      Best Callback Number: [phone]       HUB PROVIDED THE RELAY MESSAGE FROM THE OFFICE      PATIENT: HAS FURTHER QUESTIONS AND WOULD LIKE A CALL BACK AT THE FOLLOWING PHONE NUMBER     ADDITIONAL INFORMATION: PATIENT STATES THAT HE IS TAKING THE 20MG

## 2024-08-22 DIAGNOSIS — F41.9 ANXIETY: ICD-10-CM

## 2024-08-22 RX ORDER — FLUOXETINE HYDROCHLORIDE 20 MG/1
20 CAPSULE ORAL DAILY
Qty: 30 CAPSULE | Refills: 1 | Status: SHIPPED | OUTPATIENT
Start: 2024-08-22

## 2024-09-19 DIAGNOSIS — F41.9 ANXIETY: ICD-10-CM

## 2024-09-19 NOTE — TELEPHONE ENCOUNTER
Received a 90 day supply request.  Please advise if appropriate.    LOV 2/27/24  NOV None  LF 8/22/24      Pt was to follow up in May. Please schedule an appt.    [facility] RMA

## 2024-09-24 ENCOUNTER — TELEMEDICINE (OUTPATIENT)
Dept: FAMILY MEDICINE CLINIC | Facility: CLINIC | Age: 49
End: 2024-09-24
Payer: COMMERCIAL

## 2024-09-24 DIAGNOSIS — I10 HYPERTENSION, ESSENTIAL: Primary | ICD-10-CM

## 2024-09-24 DIAGNOSIS — F41.9 ANXIETY: ICD-10-CM

## 2024-09-24 PROCEDURE — 99214 OFFICE O/P EST MOD 30 MIN: CPT | Performed by: FAMILY MEDICINE

## 2024-09-24 RX ORDER — LISINOPRIL AND HYDROCHLOROTHIAZIDE 20; 25 MG/1; MG/1
1 TABLET ORAL DAILY
Qty: 90 TABLET | Refills: 1 | Status: SHIPPED | OUTPATIENT
Start: 2024-09-24

## 2024-11-03 DIAGNOSIS — N52.8 OTHER MALE ERECTILE DYSFUNCTION: ICD-10-CM

## 2024-11-04 RX ORDER — TADALAFIL 5 MG/1
5 TABLET ORAL DAILY
Qty: 30 TABLET | Refills: 3 | Status: SHIPPED | OUTPATIENT
Start: 2024-11-04

## 2024-11-04 NOTE — TELEPHONE ENCOUNTER
LOV                  9/24/2024                   NOV                  Due in March   LAST REFILL     4/2/2024  PROTOCOL       Met

## 2024-11-07 ENCOUNTER — HOSPITAL ENCOUNTER (EMERGENCY)
Facility: HOSPITAL | Age: 49
Discharge: HOME | End: 2024-11-07
Attending: EMERGENCY MEDICINE | Admitting: EMERGENCY MEDICINE
Payer: COMMERCIAL

## 2024-11-07 ENCOUNTER — APPOINTMENT (OUTPATIENT)
Dept: CT IMAGING | Facility: HOSPITAL | Age: 49
End: 2024-11-07
Payer: COMMERCIAL

## 2024-11-07 VITALS
HEIGHT: 76 IN | TEMPERATURE: 97.7 F | HEART RATE: 61 BPM | SYSTOLIC BLOOD PRESSURE: 126 MMHG | WEIGHT: 220 LBS | RESPIRATION RATE: 15 BRPM | OXYGEN SATURATION: 95 % | BODY MASS INDEX: 26.79 KG/M2 | DIASTOLIC BLOOD PRESSURE: 81 MMHG

## 2024-11-07 DIAGNOSIS — K57.90 DIVERTICULOSIS: ICD-10-CM

## 2024-11-07 DIAGNOSIS — R10.32 ACUTE LEFT LOWER QUADRANT PAIN: Primary | ICD-10-CM

## 2024-11-07 LAB
ALBUMIN SERPL-MCNC: 4.7 G/DL (ref 3.5–5.2)
ALBUMIN/GLOB SERPL: 2 G/DL
ALP SERPL-CCNC: 55 U/L (ref 39–117)
ALT SERPL W P-5'-P-CCNC: 28 U/L (ref 1–41)
ANION GAP SERPL CALCULATED.3IONS-SCNC: 11.7 MMOL/L (ref 5–15)
AST SERPL-CCNC: 22 U/L (ref 1–40)
BASOPHILS # BLD AUTO: 0.1 10*3/MM3 (ref 0–0.2)
BASOPHILS NFR BLD AUTO: 0.9 % (ref 0–1.5)
BILIRUB SERPL-MCNC: 0.7 MG/DL (ref 0–1.2)
BILIRUB UR QL STRIP: NEGATIVE
BUN SERPL-MCNC: 10 MG/DL (ref 6–20)
BUN/CREAT SERPL: 9.9 (ref 7–25)
CALCIUM SPEC-SCNC: 9.7 MG/DL (ref 8.6–10.5)
CHLORIDE SERPL-SCNC: 99 MMOL/L (ref 98–107)
CLARITY UR: CLEAR
CO2 SERPL-SCNC: 29.3 MMOL/L (ref 22–29)
COLOR UR: YELLOW
CREAT SERPL-MCNC: 1.01 MG/DL (ref 0.76–1.27)
DEPRECATED RDW RBC AUTO: 41.2 FL (ref 37–54)
EGFRCR SERPLBLD CKD-EPI 2021: 92.3 ML/MIN/1.73
EOSINOPHIL # BLD AUTO: 1.04 10*3/MM3 (ref 0–0.4)
EOSINOPHIL NFR BLD AUTO: 9.5 % (ref 0.3–6.2)
ERYTHROCYTE [DISTWIDTH] IN BLOOD BY AUTOMATED COUNT: 12 % (ref 12.3–15.4)
GLOBULIN UR ELPH-MCNC: 2.3 GM/DL
GLUCOSE SERPL-MCNC: 108 MG/DL (ref 65–99)
GLUCOSE UR STRIP-MCNC: NEGATIVE MG/DL
HCT VFR BLD AUTO: 45.9 % (ref 37.5–51)
HGB BLD-MCNC: 16.1 G/DL (ref 13–17.7)
HGB UR QL STRIP.AUTO: NEGATIVE
HOLD SPECIMEN: NORMAL
HOLD SPECIMEN: NORMAL
IMM GRANULOCYTES # BLD AUTO: 0.04 10*3/MM3 (ref 0–0.05)
IMM GRANULOCYTES NFR BLD AUTO: 0.4 % (ref 0–0.5)
KETONES UR QL STRIP: ABNORMAL
LEUKOCYTE ESTERASE UR QL STRIP.AUTO: NEGATIVE
LIPASE SERPL-CCNC: 44 U/L (ref 13–60)
LYMPHOCYTES # BLD AUTO: 1.16 10*3/MM3 (ref 0.7–3.1)
LYMPHOCYTES NFR BLD AUTO: 10.6 % (ref 19.6–45.3)
MCH RBC QN AUTO: 32.9 PG (ref 26.6–33)
MCHC RBC AUTO-ENTMCNC: 35.1 G/DL (ref 31.5–35.7)
MCV RBC AUTO: 93.9 FL (ref 79–97)
MONOCYTES # BLD AUTO: 0.7 10*3/MM3 (ref 0.1–0.9)
MONOCYTES NFR BLD AUTO: 6.4 % (ref 5–12)
NEUTROPHILS NFR BLD AUTO: 7.86 10*3/MM3 (ref 1.7–7)
NEUTROPHILS NFR BLD AUTO: 72.2 % (ref 42.7–76)
NITRITE UR QL STRIP: NEGATIVE
NRBC BLD AUTO-RTO: 0 /100 WBC (ref 0–0.2)
PH UR STRIP.AUTO: 5.5 [PH] (ref 5–8)
PLATELET # BLD AUTO: 274 10*3/MM3 (ref 140–450)
PMV BLD AUTO: 10.4 FL (ref 6–12)
POTASSIUM SERPL-SCNC: 3.9 MMOL/L (ref 3.5–5.2)
PROT SERPL-MCNC: 7 G/DL (ref 6–8.5)
PROT UR QL STRIP: NEGATIVE
RBC # BLD AUTO: 4.89 10*6/MM3 (ref 4.14–5.8)
SODIUM SERPL-SCNC: 140 MMOL/L (ref 136–145)
SP GR UR STRIP: 1.02 (ref 1–1.03)
UROBILINOGEN UR QL STRIP: ABNORMAL
WBC NRBC COR # BLD AUTO: 10.9 10*3/MM3 (ref 3.4–10.8)
WHOLE BLOOD HOLD COAG: NORMAL
WHOLE BLOOD HOLD SPECIMEN: NORMAL

## 2024-11-07 PROCEDURE — 81003 URINALYSIS AUTO W/O SCOPE: CPT | Performed by: EMERGENCY MEDICINE

## 2024-11-07 PROCEDURE — 80053 COMPREHEN METABOLIC PANEL: CPT | Performed by: EMERGENCY MEDICINE

## 2024-11-07 PROCEDURE — 36415 COLL VENOUS BLD VENIPUNCTURE: CPT

## 2024-11-07 PROCEDURE — 83690 ASSAY OF LIPASE: CPT | Performed by: EMERGENCY MEDICINE

## 2024-11-07 PROCEDURE — 99284 EMERGENCY DEPT VISIT MOD MDM: CPT

## 2024-11-07 PROCEDURE — 74176 CT ABD & PELVIS W/O CONTRAST: CPT

## 2024-11-07 PROCEDURE — 85025 COMPLETE CBC W/AUTO DIFF WBC: CPT | Performed by: EMERGENCY MEDICINE

## 2024-11-07 RX ORDER — METRONIDAZOLE 500 MG/1
500 TABLET ORAL 3 TIMES DAILY
Qty: 21 TABLET | Refills: 0 | Status: SHIPPED | OUTPATIENT
Start: 2024-11-07

## 2024-11-07 RX ORDER — DICYCLOMINE HCL 20 MG
20 TABLET ORAL EVERY 6 HOURS PRN
Qty: 20 TABLET | Refills: 0 | Status: SHIPPED | OUTPATIENT
Start: 2024-11-07

## 2024-11-07 RX ORDER — SODIUM CHLORIDE 0.9 % (FLUSH) 0.9 %
10 SYRINGE (ML) INJECTION AS NEEDED
Status: DISCONTINUED | OUTPATIENT
Start: 2024-11-07 | End: 2024-11-07 | Stop reason: HOSPADM

## 2024-11-07 RX ORDER — METRONIDAZOLE 500 MG/1
500 TABLET ORAL ONCE
Status: COMPLETED | OUTPATIENT
Start: 2024-11-07 | End: 2024-11-07

## 2024-11-07 RX ORDER — CEPHALEXIN 500 MG/1
500 CAPSULE ORAL 3 TIMES DAILY
Qty: 21 CAPSULE | Refills: 0 | Status: SHIPPED | OUTPATIENT
Start: 2024-11-07

## 2024-11-07 RX ORDER — CEPHALEXIN 500 MG/1
500 CAPSULE ORAL ONCE
Status: COMPLETED | OUTPATIENT
Start: 2024-11-07 | End: 2024-11-07

## 2024-11-07 RX ADMIN — METRONIDAZOLE 500 MG: 500 TABLET, FILM COATED ORAL at 19:01

## 2024-11-07 RX ADMIN — CEPHALEXIN 500 MG: 500 CAPSULE ORAL at 19:01

## 2024-11-07 NOTE — ED PROVIDER NOTES
EMERGENCY DEPARTMENT ENCOUNTER    Room Number:  33/33  PCP: Arielle Skaggs MD  Historian: Patient, spouse    I initially evaluated the patient at 1615    HPI:  Chief Complaint: Left lower quadrant pain  A complete HPI/ROS/PMH/PSH/SH/FH are unobtainable due to: Nothing  Context: Chandu Headley is a 48 y.o. male with a medical history of anxiety, diverticulosis who presents to the ED c/o acute left lower quadrant pain for the past several days.  Pain is constant but waxing and waning.  Pain is dull and aching.  Nothing makes the pain better or worse.  He had a few episodes of nonbloody vomiting last night.  He had a normal bowel movement yesterday.  Denies fever, chills, chest pain, shortness of breath, diarrhea, flank pain, or dysuria.  Denies prior abdominal surgery or history of kidney stones diverticulitis.  Pain was more intense this morning but is currently mild.            PAST MEDICAL HISTORY  Active Ambulatory Problems     Diagnosis Date Noted    Panic disorder with agoraphobia     Onychomycosis of toenail     High risk medication use     Allergic rhinitis     Anxiety     Groin strain 05/15/2020    Physical exam 11/11/2021    Other male erectile dysfunction 11/11/2021    Hypertension, essential 11/11/2021    Hyperlipemia, mixed 11/12/2021    Other fatigue 02/15/2022    Snores 02/15/2022    MELISSA on CPAP 08/15/2022    Screen for colon cancer 04/03/2024     Resolved Ambulatory Problems     Diagnosis Date Noted    No Resolved Ambulatory Problems     Past Medical History:   Diagnosis Date    Diverticulosis     Sleep apnea          PAST SURGICAL HISTORY  Past Surgical History:   Procedure Laterality Date    COLONOSCOPY N/A 8/15/2024    Procedure: COLONOSCOPY to cecum;  Surgeon: Mg Newman MD;  Location: [facility] ENDOSCOPY;  Service: General;  Laterality: N/A;  Pre - screening, family h/o colon cancer.  Post - diverticulosis    WISDOM TOOTH EXTRACTION           FAMILY HISTORY  Family History   Problem  Relation Age of Onset    No Known Problems Mother     Arthritis Paternal Grandmother          SOCIAL HISTORY  Social History     Socioeconomic History    Marital status:    Tobacco Use    Smoking status: Former    Smokeless tobacco: Never   Vaping Use    Vaping status: Never Used   Substance and Sexual Activity    Alcohol use: Yes    Drug use: No    Sexual activity: Defer         ALLERGIES  Penicillins    REVIEW OF SYSTEMS  Review of Systems  Included in HPI  All systems reviewed and negative except for those discussed in HPI.      PHYSICAL EXAM  ED Triage Vitals   Temp Heart Rate Resp BP SpO2   11/07/24 1540 11/07/24 1540 11/07/24 1540 11/07/24 1542 11/07/24 1540   97.7 °F (36.5 °C) 69 16 139/95 96 %      Temp src Heart Rate Source Patient Position BP Location FiO2 (%)   -- -- -- -- --              Physical Exam      GENERAL: Awake, alert, oriented x 3.  Well-developed, well-nourished male.  Resting comfortably in no acute distress  HENT: NCAT, nares patent  EYES: no scleral icterus  CV: regular rhythm, normal rate  RESPIRATORY: normal effort, clear to auscultation bilaterally  ABDOMEN: soft, nondistended, there is left lower quadrant tenderness without rebound or guarding, no CVA tenderness  MUSCULOSKELETAL: Extremities are nontender with full range of motion  NEURO: Speech is normal.  No facial droop.  PSYCH:  calm, cooperative  SKIN: warm, dry    Vital signs and nursing notes reviewed.          LAB RESULTS  Recent Results (from the past 24 hours)   Comprehensive Metabolic Panel    Collection Time: 11/07/24  3:49 PM    Specimen: Arm, Right; Blood   Result Value Ref Range    Glucose 108 (H) 65 - 99 mg/dL    BUN 10 6 - 20 mg/dL    Creatinine 1.01 0.76 - 1.27 mg/dL    Sodium 140 136 - 145 mmol/L    Potassium 3.9 3.5 - 5.2 mmol/L    Chloride 99 98 - 107 mmol/L    CO2 29.3 (H) 22.0 - 29.0 mmol/L    Calcium 9.7 8.6 - 10.5 mg/dL    Total Protein 7.0 6.0 - 8.5 g/dL    Albumin 4.7 3.5 - 5.2 g/dL    ALT (SGPT) 28 1  - 41 U/L    AST (SGOT) 22 1 - 40 U/L    Alkaline Phosphatase 55 39 - 117 U/L    Total Bilirubin 0.7 0.0 - 1.2 mg/dL    Globulin 2.3 gm/dL    A/G Ratio 2.0 g/dL    BUN/Creatinine Ratio 9.9 7.0 - 25.0    Anion Gap 11.7 5.0 - 15.0 mmol/L    eGFR 92.3 >60.0 mL/min/1.73   Lipase    Collection Time: 11/07/24  3:49 PM    Specimen: Arm, Right; Blood   Result Value Ref Range    Lipase 44 13 - 60 U/L   Green Top (Gel)    Collection Time: 11/07/24  3:49 PM   Result Value Ref Range    Extra Tube Hold for add-ons.    Lavender Top    Collection Time: 11/07/24  3:49 PM   Result Value Ref Range    Extra Tube hold for add-on    Gold Top - SST    Collection Time: 11/07/24  3:49 PM   Result Value Ref Range    Extra Tube Hold for add-ons.    Light Blue Top    Collection Time: 11/07/24  3:49 PM   Result Value Ref Range    Extra Tube Hold for add-ons.    CBC Auto Differential    Collection Time: 11/07/24  3:49 PM    Specimen: Arm, Right; Blood   Result Value Ref Range    WBC 10.90 (H) 3.40 - 10.80 10*3/mm3    RBC 4.89 4.14 - 5.80 10*6/mm3    Hemoglobin 16.1 13.0 - 17.7 g/dL    Hematocrit 45.9 37.5 - 51.0 %    MCV 93.9 79.0 - 97.0 fL    MCH 32.9 26.6 - 33.0 pg    MCHC 35.1 31.5 - 35.7 g/dL    RDW 12.0 (L) 12.3 - 15.4 %    RDW-SD 41.2 37.0 - 54.0 fl    MPV 10.4 6.0 - 12.0 fL    Platelets 274 140 - 450 10*3/mm3    Neutrophil % 72.2 42.7 - 76.0 %    Lymphocyte % 10.6 (L) 19.6 - 45.3 %    Monocyte % 6.4 5.0 - 12.0 %    Eosinophil % 9.5 (H) 0.3 - 6.2 %    Basophil % 0.9 0.0 - 1.5 %    Immature Grans % 0.4 0.0 - 0.5 %    Neutrophils, Absolute 7.86 (H) 1.70 - 7.00 10*3/mm3    Lymphocytes, Absolute 1.16 0.70 - 3.10 10*3/mm3    Monocytes, Absolute 0.70 0.10 - 0.90 10*3/mm3    Eosinophils, Absolute 1.04 (H) 0.00 - 0.40 10*3/mm3    Basophils, Absolute 0.10 0.00 - 0.20 10*3/mm3    Immature Grans, Absolute 0.04 0.00 - 0.05 10*3/mm3    nRBC 0.0 0.0 - 0.2 /100 WBC   Urinalysis With Microscopic If Indicated (No Culture) - Urine, Clean Catch    Collection  Time: 11/07/24  4:25 PM    Specimen: Urine, Clean Catch   Result Value Ref Range    Color, UA Yellow Yellow, Straw    Appearance, UA Clear Clear    pH, UA 5.5 5.0 - 8.0    Specific Gravity, UA 1.018 1.005 - 1.030    Glucose, UA Negative Negative    Ketones, UA 40 mg/dL (2+) (A) Negative    Bilirubin, UA Negative Negative    Blood, UA Negative Negative    Protein, UA Negative Negative    Leuk Esterase, UA Negative Negative    Nitrite, UA Negative Negative    Urobilinogen, UA 1.0 E.U./dL 0.2 - 1.0 E.U./dL       Ordered the above labs and reviewed the results.        RADIOLOGY  CT Abdomen Pelvis Without Contrast    Result Date: 11/7/2024  CT ABDOMEN PELVIS WO CONTRAST-  Radiation dose reduction techniques were utilized, including automated exposure control and exposure modulation based on body size.  Clinical: Left lower quadrant pain with a history of diverticulosis  COMPARISON: None  FINDINGS: 1. Solitary diverticulum arising from the mid sigmoid colon on image #120, solitary diverticulum arising from the mid descending colon on image #71. No diverticulitis. No colon wall thickening or induration of the mesentery to suggest an inflammatory process. No annular lesion. Small bowel is satisfactory in appearance. There is ingested material demonstrated within an otherwise normal-appearing stomach. The duodenum is typical in appearance.  2. There is separation between the left internal and external oblique muscles with fat deposition between. Anatomic variation versus lipoma. No atypical features seen. There is a small periumbilical hernia noted containing only mesenteric fat.  3. There is symmetric dependent atelectasis demonstrated at both lung bases.  4. The liver, gallbladder, pancreas, spleen, adrenal glands and kidneys have a normal appearance allowing for the lack of intravenous contrast. No calculus or obstructive uropathy. The ureters are normal in course and caliber to the urinary bladder which is typical in  appearance. Prostate and seminal vesicles have a normal appearance. Diameter of the aorta is within normal limits.  CONCLUSION: No acute intra-abdominal abnormality.    This report was finalized on 11/7/2024 5:39 PM by Dr. Nicola Hussein M.D on Workstation: [id number]       Ordered the above noted radiological studies. Reviewed by me in PACS.            PROCEDURES  Procedures        OUTPATIENT MEDICATION MANAGEMENT:  Current Facility-Administered Medications Ordered in Epic   Medication Dose Route Frequency Provider Last Rate Last Admin    sodium chloride 0.9 % flush 10 mL  10 mL Intravenous PRN Inocencio Archuleta MD         Current Outpatient Medications Ordered in Epic   Medication Sig Dispense Refill    cephalexin (KEFLEX) 500 MG capsule Take 1 capsule by mouth 3 (Three) Times a Day. 21 capsule 0    dicyclomine (BENTYL) 20 MG tablet Take 1 tablet by mouth Every 6 (Six) Hours As Needed (abdominal cramps). 20 tablet 0    FLUoxetine (PROzac) 20 MG capsule Take 1 capsule by mouth Daily. 90 capsule 0    lisinopril-hydrochlorothiazide (PRINZIDE,ZESTORETIC) 20-25 MG per tablet Take 1 tablet by mouth Daily. 90 tablet 1    metroNIDAZOLE (FLAGYL) 500 MG tablet Take 1 tablet by mouth 3 (Three) Times a Day. 21 tablet 0    multivitamin with minerals tablet tablet Take 1 tablet by mouth Daily.      sildenafil (REVATIO) 20 MG tablet TAKE 1-5 TABLETS BY MOUTH ONE HOUR PRIOR TO EVENT 60 tablet 5    tadalafil (CIALIS) 5 MG tablet TAKE 1 TABLET BY MOUTH EVERY DAY 30 tablet 3           MEDICATIONS GIVEN IN ER  Medications   sodium chloride 0.9 % flush 10 mL (has no administration in time range)   cephalexin (KEFLEX) capsule 500 mg (500 mg Oral Given 11/7/24 1901)   metroNIDAZOLE (FLAGYL) tablet 500 mg (500 mg Oral Given 11/7/24 1901)                   MEDICAL DECISION MAKING, PROGRESS, and CONSULTS    All labs have been independently reviewed by me.  All radiology studies have been reviewed by me and I have also reviewed the  radiology report.   EKG's independently viewed and interpreted by me.  Discussion below represents my analysis of pertinent findings related to patient's condition, differential diagnosis, treatment plan and final disposition.      Additional sources:    - Discussed/ obtained information from independent historians: Spouse at bedside    - External (non-ED) record review: Patient had a colonoscopy in August 2024 which showed multiple small and medium mouth diverticula in the sigmoid colon.    -Prescription drug monitoring program review:     N/A    - Chronic or social conditions impacting patient care (Social Determinants of Health): None          Orders placed during this visit:  Orders Placed This Encounter   Procedures    CT Abdomen Pelvis Without Contrast    [place] Draw    Comprehensive Metabolic Panel    Lipase    Urinalysis With Microscopic If Indicated (No Culture) - Urine, Clean Catch    CBC Auto Differential    NPO Diet NPO Type: Strict NPO    Undress & Gown    Insert Peripheral IV    CBC & Differential    Green Top (Gel)    Lavender Top    Gold Top - SST    Light Blue Top         Additional orders considered but not ordered:          Differential diagnosis includes, but is not limited to:    Differential diagnosis includes but is not limited to:  - hepatobiliary pathology such as cholecystitis, cholangitis, and symptomatic cholelithiasis  - Pancreatitis  - Dyspepsia  - Small bowel obstruction  - Appendicitis  - Diverticulitis  - UTI including pyelonephritis  - Ureteral stone  - Zoster  - Colitis, including infectious and ischemic  - Atypical ACS        Independent interpretation of labs, radiology studies, and discussions with consultants:  ED Course as of 11/07/24 1946 Thu Nov 07, 2024   1614 BP: 139/95 [WH]   1614 Temp: 97.7 °F (36.5 °C) [WH]   1614 Heart Rate: 69 [WH]   1614 Resp: 16 [WH]   1614 SpO2: 96 % [WH]   1615 WBC(!): 10.90 [WH]   1615 Hemoglobin: 16.1 [WH]   1620 Patient currently declines  pain or nausea medication [WH]   1621 Lipase: 44 [WH]   1621 Glucose(!): 108 [WH]   1621 CO2(!): 29.3 [WH]   1621 ALT (SGPT): 28 [WH]   1621 AST (SGOT): 22 [WH]   1622 Alkaline Phosphatase: 55 [WH]   1622 Total Bilirubin: 0.7 [WH]   1622 Anion Gap: 11.7 [WH]   1718 Ketones, UA(!): 40 mg/dL (2+) [WH]   1718 Leukocytes, UA: Negative [WH]   1718 Nitrite, UA: Negative [WH]   1752 CT abdomen/pelvis personally interpreted by me.  My personal interpretation is: Lung bases are clear.  No bowel obstruction.  No obstructive uropathy.    Per the radiologist, there is a diverticulum arising from the mid sigmoid colon and mid descending colon.  No diverticulitis.  No colonic wall thickening or induration.  No bowel obstruction.  Possible lipoma between the left internal and external oblique muscles.  Small periumbilical hernia.  See dictated report for details. [WH]   1850 Test results and diagnosis were discussed with the patient and his wife.  He is resting comfortably.  Pain is still minimal.  Patient will be discharged on antibiotics for potential diverticulitis, although CT scan was unremarkable.  He will also be given a prescription for Bentyl.  Return precautions were discussed. [WH]   1904 MDM: Patient presented to the ED complaining of left lower quadrant pain for the past few days.  He had some vomiting last night but none since.  He had mild left lower quadrant tenderness but did not have acute abdomen.  Labs were relatively unremarkable.  CT scan showed diverticulosis without evidence of diverticulitis or colitis.  Workup did not show any other explanation for his symptoms.  We will go ahead and treat him for suspected diverticulitis. [WH]      ED Course User Index  [WH] Inocencio Archuleta MD         COMPLEXITY OF CARE        DIAGNOSIS  Final diagnoses:   Acute left lower quadrant pain   Diverticulosis         DISPOSITION  DISCHARGE    Patient discharged in stable condition.    Reviewed implications of results,  diagnosis, meds, responsibility to follow up, warning signs and symptoms of possible worsening, potential complications and reasons to return to ER, including worsening/persistent symptoms, vomiting, fever, blood in stool, or other concern.    Patient/Family voiced understanding of above instructions.    Discussed plan for discharge, as there is no emergent indication for admission. Patient referred to primary care provider for BP management due to today's BP. Pt/family is agreeable and understands need for follow up and repeat testing.  Pt is aware that discharge does not mean that nothing is wrong but it indicates no emergency is present that requires admission and they must continue care with follow-up as given below or physician of their choice.     FOLLOW-UP  Arielle Skaggs MD  [address] [id number] [phone]    Schedule an appointment as soon as possible for a visit   If symptoms persist         Medication List        New Prescriptions      cephalexin 500 MG capsule  Commonly known as: KEFLEX  Take 1 capsule by mouth 3 (Three) Times a Day.     dicyclomine 20 MG tablet  Commonly known as: BENTYL  Take 1 tablet by mouth Every 6 (Six) Hours As Needed (abdominal cramps).     metroNIDAZOLE 500 MG tablet  Commonly known as: FLAGYL  Take 1 tablet by mouth 3 (Three) Times a Day.               Where to Get Your Medications        These medications were sent to [facility]/pharmacy #[id number] - [place], [place] - [address] AT [address] [facility] - [phone] [facility] [phone]   [address], [place] [id number]      Phone: [phone]   cephalexin 500 MG capsule  dicyclomine 20 MG tablet  metroNIDAZOLE 500 MG tablet                   Latest Documented Vital Signs:  AS OF 19:46 EST VITALS:    BP - 126/81  HR - 61  TEMP - 97.7 °F (36.5 °C)  O2 SATS - 95%            --    Please note that portions of this were completed with a voice recognition program.       Note Disclaimer: At  [facility], we believe that sharing information builds trust and better relationships. You are receiving this note because you are receiving care at [facility] or recently visited. It is possible you will see health information before a provider has talked with you about it. This kind of information can be easy to misunderstand. To help you fully understand what it means for your health, we urge you to discuss this note with your provider.             Inocencio Archuleta MD  11/07/24 1946

## 2024-11-07 NOTE — ED NOTES
Patient to ER via car from home for abd pain x 1 week with vomiting last night     No urinary s/s

## 2024-11-07 NOTE — DISCHARGE INSTRUCTIONS
Take medications as prescribed.  Return to the emergency department for worsening/persistent pain, fever, vomiting, diarrhea, or other concern.  Follow-up with your primary care doctor if symptoms do not improve in the next 2 to 3 days.

## 2025-01-01 DIAGNOSIS — N52.8 OTHER MALE ERECTILE DYSFUNCTION: ICD-10-CM

## 2025-01-02 RX ORDER — SILDENAFIL CITRATE 20 MG/1
TABLET ORAL
Qty: 60 TABLET | Refills: 5 | Status: SHIPPED | OUTPATIENT
Start: 2025-01-02

## 2025-01-02 NOTE — TELEPHONE ENCOUNTER
LOV      9/24/2024                   NOV                  due 3/24/2025  LAST REFILL     2/19/2024  PROTOCOL       Met

## 2025-01-19 DIAGNOSIS — I10 HYPERTENSION, ESSENTIAL: ICD-10-CM

## 2025-01-20 RX ORDER — LISINOPRIL AND HYDROCHLOROTHIAZIDE 20; 25 MG/1; MG/1
1 TABLET ORAL DAILY
Qty: 90 TABLET | Refills: 1 | Status: SHIPPED | OUTPATIENT
Start: 2025-01-20

## 2025-01-20 NOTE — TELEPHONE ENCOUNTER
LOV                  9/24/2024                    NOV                 due 3/24/2025  LAST REFILL     9/24/2024  PROTOCOL       Met

## 2025-04-09 DIAGNOSIS — N52.8 OTHER MALE ERECTILE DYSFUNCTION: ICD-10-CM

## 2025-04-09 RX ORDER — TADALAFIL 5 MG/1
5 TABLET ORAL DAILY
Qty: 30 TABLET | Refills: 0 | Status: SHIPPED | OUTPATIENT
Start: 2025-04-09

## 2025-04-09 NOTE — TELEPHONE ENCOUNTER
LOV                  9/24/2024                    NOV                  Visit date not found-needs ov  LAST REFILL     11/4/2024  PROTOCOL       Met

## 2025-04-09 NOTE — TELEPHONE ENCOUNTER
I called and left a message for the patient informing he is due for a follow up visit in the office. I asked him to call to schedule.

## 2025-07-29 DIAGNOSIS — F41.9 ANXIETY: ICD-10-CM

## 2025-07-29 NOTE — TELEPHONE ENCOUNTER
Rx Refill Note  Requested Prescriptions     Pending Prescriptions Disp Refills    FLUoxetine (PROzac) 20 MG capsule [Pharmacy Med Name: FLUOXETINE HCL 20 MG CAPSULE] 30 capsule 0     Sig: TAKE 1 CAPSULE BY MOUTH EVERY DAY      Last office visit with prescribing clinician: 2/27/2024   Last telemedicine visit with prescribing clinician: Visit date not found   Next office visit with prescribing clinician: Visit date not found                         Would you like a call back once the refill request has been completed: [] Yes [] No    If the office needs to give you a call back, can they leave a voicemail: [] Yes [] No    Liat Zaragoza MA  07/29/25, 13:51 EDT      Patient needs appointment

## 2025-07-29 NOTE — TELEPHONE ENCOUNTER
LOV                  9/24/2024                    NOV                  Visit date not found  LAST REFILL     9/30/2024  PROTOCOL       met

## 2025-08-03 DIAGNOSIS — N52.8 OTHER MALE ERECTILE DYSFUNCTION: ICD-10-CM

## 2025-08-07 RX ORDER — TADALAFIL 5 MG/1
5 TABLET ORAL DAILY
Qty: 30 TABLET | Refills: 0 | OUTPATIENT
Start: 2025-08-07

## 2025-08-27 ENCOUNTER — TELEPHONE (OUTPATIENT)
Dept: FAMILY MEDICINE CLINIC | Facility: CLINIC | Age: 50
End: 2025-08-27
Payer: COMMERCIAL

## 2025-08-27 DIAGNOSIS — N52.8 OTHER MALE ERECTILE DYSFUNCTION: ICD-10-CM

## 2025-08-29 RX ORDER — TADALAFIL 5 MG/1
5 TABLET ORAL DAILY
Qty: 30 TABLET | Refills: 0 | OUTPATIENT
Start: 2025-08-29

## (undated) DEVICE — CANN O2 ETCO2 FITS ALL CONN CO2 SMPL A/ 7IN DISP LF

## (undated) DEVICE — ADAPT CLN BIOGUARD AIR/H2O DISP

## (undated) DEVICE — KT ORCA ORCAPOD DISP STRL

## (undated) DEVICE — LN SMPL CO2 SHTRM SD STREAM W/M LUER

## (undated) DEVICE — TUBING, SUCTION, 1/4" X 10', STRAIGHT: Brand: MEDLINE

## (undated) DEVICE — SENSR O2 OXIMAX FNGR A/ 18IN NONSTR